# Patient Record
Sex: FEMALE | Race: WHITE | Employment: FULL TIME | URBAN - METROPOLITAN AREA
[De-identification: names, ages, dates, MRNs, and addresses within clinical notes are randomized per-mention and may not be internally consistent; named-entity substitution may affect disease eponyms.]

---

## 2019-03-21 ENCOUNTER — NURSE ONLY (OUTPATIENT)
Dept: FAMILY MEDICINE CLINIC | Age: 74
End: 2019-03-21

## 2019-04-09 ENCOUNTER — TELEPHONE (OUTPATIENT)
Dept: PRIMARY CARE CLINIC | Age: 74
End: 2019-04-09

## 2019-04-16 ENCOUNTER — NURSE TRIAGE (OUTPATIENT)
Dept: OTHER | Facility: CLINIC | Age: 74
End: 2019-04-16

## 2019-04-17 ENCOUNTER — NURSE TRIAGE (OUTPATIENT)
Dept: OTHER | Facility: CLINIC | Age: 74
End: 2019-04-17

## 2019-05-14 ENCOUNTER — TELEPHONE (OUTPATIENT)
Dept: PODIATRY | Age: 74
End: 2019-05-14

## 2019-05-29 ENCOUNTER — PROCEDURE VISIT (OUTPATIENT)
Dept: INTERVENTIONAL RADIOLOGY/VASCULAR | Age: 74
End: 2019-05-29

## 2019-05-29 DIAGNOSIS — M79.605 LEG PAIN, BILATERAL: Primary | ICD-10-CM

## 2019-05-29 DIAGNOSIS — M79.604 LEG PAIN, BILATERAL: Primary | ICD-10-CM

## 2019-06-03 ENCOUNTER — CARE COORDINATION (OUTPATIENT)
Dept: CASE MANAGEMENT | Age: 74
End: 2019-06-03

## 2019-06-10 ENCOUNTER — TELEPHONE (OUTPATIENT)
Dept: OTHER | Age: 74
End: 2019-06-10

## 2019-06-25 ENCOUNTER — CARE COORDINATION (OUTPATIENT)
Dept: CASE MANAGEMENT | Age: 74
End: 2019-06-25

## 2019-06-25 RX ORDER — FUROSEMIDE 10 MG/ML
SOLUTION ORAL DAILY
COMMUNITY
End: 2022-10-26 | Stop reason: ALTCHOICE

## 2019-07-15 ENCOUNTER — CARE COORDINATION (OUTPATIENT)
Dept: CASE MANAGEMENT | Age: 74
End: 2019-07-15

## 2019-07-24 ENCOUNTER — CARE COORDINATION (OUTPATIENT)
Dept: CASE MANAGEMENT | Age: 74
End: 2019-07-24

## 2019-08-02 ENCOUNTER — CARE COORDINATION (OUTPATIENT)
Dept: OTHER | Facility: CLINIC | Age: 74
End: 2019-08-02

## 2019-08-02 ENCOUNTER — CARE COORDINATION (OUTPATIENT)
Dept: CASE MANAGEMENT | Age: 74
End: 2019-08-02

## 2019-08-10 ENCOUNTER — CARE COORDINATION (OUTPATIENT)
Dept: CASE MANAGEMENT | Age: 74
End: 2019-08-10

## 2019-08-20 NOTE — PROGRESS NOTES
[id number]'Patient: Adult Zztest   Patient : 1955   MRN: <[id number]>     Reason for Admission: ***  Discharge Date: 19       RARS: No data recorded     Last Discharge [address] fjsdflsd  fsdf SDJ'f SDfl;SKJDf   SfjcL:SDJf\"ZS:      [id number]

## 2019-08-22 ENCOUNTER — CASE MANAGEMENT (OUTPATIENT)
Dept: OTHER | Facility: CLINIC | Age: 74
End: 2019-08-22

## 2019-09-11 ENCOUNTER — CARE COORDINATION (OUTPATIENT)
Dept: OTHER | Facility: CLINIC | Age: 74
End: 2019-09-11

## 2019-09-17 ENCOUNTER — CARE COORDINATION (OUTPATIENT)
Dept: OTHER | Facility: CLINIC | Age: 74
End: 2019-09-17

## 2019-09-17 SDOH — ECONOMIC STABILITY: TRANSPORTATION INSECURITY
IN THE PAST 12 MONTHS, HAS THE LACK OF TRANSPORTATION KEPT YOU FROM MEDICAL APPOINTMENTS OR FROM GETTING MEDICATIONS?: YES

## 2019-09-17 SDOH — ECONOMIC STABILITY: TRANSPORTATION INSECURITY
IN THE PAST 12 MONTHS, HAS LACK OF TRANSPORTATION KEPT YOU FROM MEETINGS, WORK, OR FROM GETTING THINGS NEEDED FOR DAILY LIVING?: YES

## 2019-09-18 ENCOUNTER — CARE COORDINATION (OUTPATIENT)
Dept: OTHER | Facility: CLINIC | Age: 74
End: 2019-09-18

## 2019-10-02 ENCOUNTER — CARE COORDINATION (OUTPATIENT)
Dept: CASE MANAGEMENT | Age: 74
End: 2019-10-02

## 2019-10-02 DIAGNOSIS — I10 ESSENTIAL HYPERTENSION: Primary | ICD-10-CM

## 2019-10-09 RX ORDER — ACETAMINOPHEN 325 MG/1
325 TABLET ORAL EVERY 4 HOURS PRN
COMMUNITY
End: 2022-09-13

## 2019-10-09 NOTE — CARE COORDINATION
flags related to discharge diagnosis. Reviewed and educated them on any new and changed medications related to discharge diagnosis. Advised obtaining a 90-day supply of all daily and as-needed medications. Education provided regarding infection prevention, and signs and symptoms of COVID-19 and when to seek medical attention with patient who verbalized understanding. Discussed exposure protocols and quarantine from [address] you at higher risk for severe illness 2019 and given an opportunity for questions and concerns. The patient agrees to contact the COVID-19 hotline [phone] or PCP office for questions related to their healthcare. CTN/ACM provided contact information for future reference. From CDC: Are you at higher risk for severe illness?            Wash your hands often.            Avoid close contact (6 feet, which is about two arm lengths) with people who are sick.            Put distance between yourself and other people if COVID-19 is spreading in your community.            Clean and disinfect frequently touched surfaces.            Avoid all cruise travel and non-essential air travel.            Call your healthcare professional if you have concerns about COVID-19 and your underlying condition or if you are sick. For more information on steps you can take to protect yourself, see CDC's How to Protect Yourself     Patient/family/caregiver given information for Jeffy Cantor and agrees to enroll yes  Patient's preferred e-mail: [email or web address] [email or web address]   Patient's preferred phone number: [phone]  Based on Loop alert triggers, patient will be contacted by nurse care manager for worsening symptoms.               Facility: [unfilled]    Non-face-to-face services provided:  Scheduled appointment with PCP-5/20    Care Transitions 24 Hour Call    Schedule Follow Up Appointment with PCP:  Declined  Patient-reported symptoms:  Pain (Comment: 10/9 incision to abdomen pain improving

## 2019-10-29 ENCOUNTER — CARE COORDINATION (OUTPATIENT)
Dept: CASE MANAGEMENT | Age: 74
End: 2019-10-29

## 2019-11-08 ENCOUNTER — TELEPHONE (OUTPATIENT)
Dept: PULMONOLOGY | Age: 74
End: 2019-11-08

## 2019-11-14 ENCOUNTER — CARE COORDINATION (OUTPATIENT)
Dept: CARE COORDINATION | Age: 74
End: 2019-11-14

## 2019-11-14 ENCOUNTER — NURSE ONLY (OUTPATIENT)
Dept: CARE COORDINATION | Age: 74
End: 2019-11-14

## 2019-11-15 ENCOUNTER — CARE COORDINATION (OUTPATIENT)
Dept: CARE COORDINATION | Age: 74
End: 2019-11-15

## 2019-11-26 DIAGNOSIS — I10 ESSENTIAL HYPERTENSION: Primary | ICD-10-CM

## 2019-12-02 ENCOUNTER — CARE COORDINATION (OUTPATIENT)
Dept: CARE COORDINATION | Age: 74
End: 2019-12-02

## 2019-12-02 SDOH — SOCIAL STABILITY: SOCIAL NETWORK
DO YOU BELONG TO ANY CLUBS OR ORGANIZATIONS SUCH AS CHURCH GROUPS UNIONS, FRATERNAL OR ATHLETIC GROUPS, OR SCHOOL GROUPS?: NO

## 2019-12-02 SDOH — HEALTH STABILITY: PHYSICAL HEALTH: ON AVERAGE, HOW MANY MINUTES DO YOU ENGAGE IN EXERCISE AT THIS LEVEL?: PATIENT DECLINED

## 2019-12-02 SDOH — ECONOMIC STABILITY: TRANSPORTATION INSECURITY
IN THE PAST 12 MONTHS, HAS THE LACK OF TRANSPORTATION KEPT YOU FROM MEDICAL APPOINTMENTS OR FROM GETTING MEDICATIONS?: NO

## 2019-12-02 SDOH — ECONOMIC STABILITY: FOOD INSECURITY: WITHIN THE PAST 12 MONTHS, THE FOOD YOU BOUGHT JUST DIDN'T LAST AND YOU DIDN'T HAVE MONEY TO GET MORE.: SOMETIMES TRUE

## 2019-12-02 SDOH — ECONOMIC STABILITY: FOOD INSECURITY: WITHIN THE PAST 12 MONTHS, YOU WORRIED THAT YOUR FOOD WOULD RUN OUT BEFORE YOU GOT MONEY TO BUY MORE.: SOMETIMES TRUE

## 2019-12-02 SDOH — SOCIAL STABILITY: SOCIAL NETWORK: HOW OFTEN DO YOU ATTEND CHURCH OR RELIGIOUS SERVICES?: MORE THAN 4 TIMES PER YEAR

## 2019-12-02 SDOH — HEALTH STABILITY: MENTAL HEALTH: HOW MANY STANDARD DRINKS CONTAINING ALCOHOL DO YOU HAVE ON A TYPICAL DAY?: 1 OR 2

## 2019-12-02 SDOH — SOCIAL STABILITY: SOCIAL NETWORK: HOW OFTEN DO YOU ATTENT MEETINGS OF THE CLUB OR ORGANIZATION YOU BELONG TO?: NEVER

## 2019-12-02 SDOH — HEALTH STABILITY: MENTAL HEALTH: HOW OFTEN DO YOU HAVE A DRINK CONTAINING ALCOHOL?: MONTHLY OR LESS

## 2019-12-02 SDOH — ECONOMIC STABILITY: INCOME INSECURITY: HOW HARD IS IT FOR YOU TO PAY FOR THE VERY BASICS LIKE FOOD, HOUSING, MEDICAL CARE, AND HEATING?: SOMEWHAT HARD

## 2019-12-02 SDOH — ECONOMIC STABILITY: TRANSPORTATION INSECURITY
IN THE PAST 12 MONTHS, HAS LACK OF TRANSPORTATION KEPT YOU FROM MEETINGS, WORK, OR FROM GETTING THINGS NEEDED FOR DAILY LIVING?: NO

## 2019-12-02 SDOH — SOCIAL STABILITY: SOCIAL NETWORK
IN A TYPICAL WEEK, HOW MANY TIMES DO YOU TALK ON THE PHONE WITH FAMILY, FRIENDS, OR NEIGHBORS?: MORE THAN THREE TIMES A WEEK

## 2019-12-02 SDOH — HEALTH STABILITY: PHYSICAL HEALTH
ON AVERAGE, HOW MANY DAYS PER WEEK DO YOU ENGAGE IN MODERATE TO STRENUOUS EXERCISE (LIKE A BRISK WALK)?: PATIENT DECLINED

## 2019-12-02 SDOH — SOCIAL STABILITY: SOCIAL NETWORK: HOW OFTEN DO YOU GET TOGETHER WITH FRIENDS OR RELATIVES?: TWICE A WEEK

## 2019-12-02 SDOH — SOCIAL STABILITY: SOCIAL NETWORK: ARE YOU MARRIED, WIDOWED, DIVORCED, SEPARATED, NEVER MARRIED, OR LIVING WITH A PARTNER?: MARRIED

## 2019-12-02 ASSESSMENT — PATIENT HEALTH QUESTIONNAIRE - PHQ9: SUM OF ALL RESPONSES TO PHQ QUESTIONS 1-9: 12

## 2019-12-06 DIAGNOSIS — R07.9 CHEST PAIN, UNSPECIFIED TYPE: Primary | ICD-10-CM

## 2019-12-13 DIAGNOSIS — I10 ESSENTIAL HYPERTENSION: Primary | ICD-10-CM

## 2019-12-26 DIAGNOSIS — M17.12 ARTHRITIS OF LEFT KNEE: Primary | ICD-10-CM

## 2019-12-26 RX ORDER — MELOXICAM 7.5 MG/1
7.5 TABLET ORAL DAILY
Qty: 30 TABLET | Refills: 5 | Status: SHIPPED | OUTPATIENT
Start: 2019-12-26 | End: 2022-07-13

## 2020-01-16 ENCOUNTER — CLINICAL DOCUMENTATION (OUTPATIENT)
Dept: ONCOLOGY | Age: 75
End: 2020-01-16

## 2020-01-18 ENCOUNTER — TELEPHONE (OUTPATIENT)
Dept: OTHER | Age: 75
End: 2020-01-18

## 2020-02-03 NOTE — CARE COORDINATION
PRIOR TO SENDING THIS REFERRAL IN THE MANNER NOTED BELOW, PLEASE ADD ALL AMBULATORY CARE COORDINATION SOCIAL WORKERS ([facility] SW'S) TO THE PATIENT'S CARE TEAM.    PLEASE ANSWER \"YES\" TO AT LEAST ONE OF THE QUESTIONS BELOW, OR, IF DESIRED,  PROVIDE A NARRATIVE DESCRIPTION FOR WHY YOU ARE MAKING THIS REFERRAL    THIS SMARTPHRASE SHOULD BE IMBEDDED IN THE MOST RECENT ENCOUNTER IN WHICH YOU DISCUSSED SW REFERRAL WITH THE PATIENT. YOU DO NOT NEED TO ROUTE THIS ENCOUNTER OR A STAFF MESSAGE REGARDING THIS REFERRAL TO THE [facility] SW'S (ALTHOUGH YOU MAY IF YOU WISH). YOU MUST ADD ALL OF THE [facility] SW'S TO THE CARE TEAM PRIOR TO COMPLETING THIS SMARTPHRASE, HOWEVER.    -This patient is referred this date to [facility] SW'S for review, assessment, and consultation as needed regarding the following presenting concern(s). Please bold all that apply. {Yes No N/A:[id number]}    [facility] assessment of patient's mental health diagnosis (if any) is indicated and requested    {Yes No N/A:[id number]}    [facility] assessment of patient's substance use disorder, if any, is indicated and requested. {Yes No [id number]   Patient has concerns about apparently deteriorating mental status. {Yes No D/O:[id number]}   Patient desires assistance with locating an accessible behavioral health treatment provider. {Yes No I/X:7827176916}   Patient has questions/concerns regarding application and/or eligibility for Medicaid. {Yes No S/C:3619552132}   Patient has questions/concerns regarding application and/or eligibility for a Medicaid Waiver program ([place], Home Care Waiver, Assisted Living Waiver, etc.). {Yes No L/H:9945486886}   Patient has questions/concerns about the cost of prescription drugs. {Yes No G/V:9517160254}   Patient has questions/concerns regarding Medicare coverage, Including Part D prescription drug coverage.      {Yes No I/L:3457836956}   Patient desires supportive services in the home regarding activities of daily living (homemaking, transferring, bathing, toileting, transportation, shopping, etc.). {Yes No E/C:1439776336}   Patient desires information about long-term care in a skilled nursing facility (SNF). {Yes No Z/V:7030815013}   Patient has inadequate and/or unaffordable housing. {Yes No [id number]   Patient desires assistance with smoking cessation (Note: select this option only if patient is reasonably unable to participate in smoking cessation support groups offered periodically at [facility]). {Yes No B/Z:2035271046}   Patient desires information about advance directives (Power of  [place], Living Will, DNR, Guardianship, etc.).     Please provide additional information if needed: (additional needs, household size, monthly income, source of income) ***      PLEASE ENSURE THAT THIS SMARTPHRASE HAS BEEN INCLUDED IN YOUR MOST RECENT ENCOUNTER WITH THE PATIENT AND THAT YOU HAVE ADDED ALL ACC SW\"S TO THE CARE TEAM.

## 2020-02-07 ENCOUNTER — TELEPHONE (OUTPATIENT)
Dept: ADMINISTRATIVE | Age: 75
End: 2020-02-07

## 2020-02-25 ENCOUNTER — CARE COORDINATION (OUTPATIENT)
Dept: CARE COORDINATION | Age: 75
End: 2020-02-25

## 2020-03-12 ENCOUNTER — CARE COORDINATION (OUTPATIENT)
Dept: CARE COORDINATION | Age: 75
End: 2020-03-12

## 2020-03-12 ENCOUNTER — CARE COORDINATION (OUTPATIENT)
Dept: FAMILY MEDICINE CLINIC | Age: 75
End: 2020-03-12

## 2020-03-30 ENCOUNTER — CARE COORDINATION (OUTPATIENT)
Dept: CARE COORDINATION | Age: 75
End: 2020-03-30

## 2020-05-04 ENCOUNTER — CARE COORDINATION (OUTPATIENT)
Dept: OTHER | Facility: CLINIC | Age: 75
End: 2020-05-04

## 2020-05-04 NOTE — PATIENT INSTRUCTIONS
disease. This may be because the same things that cause preeclampsia also cause heart and kidney disease. To protect your health, work with your doctor on living a heart-healthy lifestyle and getting the checkups you need. Your doctor may also want you to check your blood pressure at home. Follow-up care is a key part of your treatment and safety. Be sure to make and go to all appointments, and call your doctor if you are having problems. It's also a good idea to know your test results and keep a list of the medicines you take. Where can you learn more? Go to [email or web address] and sign in to your [employer] account. Enter Y205 in the UsherBuddy box to learn more about \"Learning About Preeclampsia After Childbirth. \"     If you do not have an account, please click on the \"Sign Up Now\" link. Current as of: May 29, 2019Content Version: 12.4  © 6816-0513 Nanosys. Care instructions adapted under license by [facility] ([facility]). If you have questions about a medical condition or this instruction, always ask your healthcare professional. [place] any warranty or liability for your use of this information. Patient Education        Preeclampsia: Care Instructions  Overview    Preeclampsia occurs when a woman's blood pressure rises during pregnancy. Often with preeclampsia, you also have swelling in your legs, hands, and face. A test may show too much protein in your urine. Preeclampsia is also called toxemia. If preeclampsia is severe and not treated, it can lead to seizures (eclampsia) and damage to your liver or kidneys. Preeclampsia can prevent your baby from getting enough food and oxygen. This can cause a low birth weight or other problems. Your doctor will watch you closely to prevent these problems. He or she also may recommend that you reduce your activity.  If your preeclampsia is a danger to your health or the health of your baby,

## 2020-05-04 NOTE — CARE COORDINATION
Rocio 45 Transitions Initial Follow Up Call    Call within 2 business days of discharge: Yes    Patient: Adult Gia Patient : 1955   MRN: [id number]  Reason for Admission: [address]  Discharge Date: 20 RARS: Readmission Risk Score: 5      Last Discharge [facility]       Complaint Diagnosis Description Type Department Provider    20   Admission (Discharged) [facility] Ec/Ip Surgery Physician, MD           Spoke with: Adult Gia, patient    Facility: [address] [place] [address]        Non-face-to-face services provided:  Obtained and reviewed discharge summary and/or continuity of care documents  Education of patient/family/caregiver/guardian to support self-management-reviewed [facility]  Assessment and support for treatment adherence and medication management-referred to [address] for med questions, edu dxy  Assistance in accessing community resources-[facility], grief counceling resourceds  COVID 19 Risk Education    Care Transitions 24 Hour Call    Schedule Follow Up Appointment with PCP:  Completed  Do you have any ongoing symptoms?:  Yes  Patient-reported symptoms:  Abdominal Pain, Nausea (Comment: patient reports nausea and pain in LUQ)  Interventions for patient-reported symptoms:  Notified PCP/Physician (Comment: PCP notified via EPIC message)  Do you have a copy of your discharge instructions?:  Yes  Do you have all of your prescriptions and are they filled?:  Yes  Have you been contacted by a [facility] Pharmacist?:  Yes  Have you scheduled your follow up appointment?:  Yes  Were you discharged with any Home Care or Post Acute Services:  Yes  Patient DME:  Other, Quad cane, Walker  Patient Home Equipment:  Oxygen (Comment: Supplied by [employer])  Do you have support at home?:  Partner/Spouse/SO, Child  Do you feel like you have everything you need to keep you well at home?:  No  Are you an active caregiver in your home?:  Yes  Care Transitions Interventions    Pharmacist:  Completed

## 2020-05-06 ENCOUNTER — CARE COORDINATION (OUTPATIENT)
Dept: OTHER | Facility: CLINIC | Age: 75
End: 2020-05-06

## 2020-05-06 NOTE — CARE COORDINATION
Interventions    Pharmacist:  Completed      Physical Therapy:  Completed      Pulmonary Rehab:  Completed Transportation Support:  Completed   Diabetes Education:  Completed Medication Assistance Program:  Completed      Occupational Therapy:  Completed Social Work:  Completed         Goals Addressed                 This Visit's Progress     Nutrition Plan   Improving     I will follow a nutritional plan as directed   Low Sodium:  2g    Barriers: overwhelmed by complexity of regimen, stress, time constraints and lack of education  Plan for overcoming my barriers: pt will use a food log to monitor food intake   Confidence: 8/10  Anticipated Goal Completion Date: 5/20/20 5/12- Pt has recorded 2/3 meals each day and 1 snack. ACM suggested alarm for lunch time food log recording. Follow Up  No future appointments.     Edwin Hinojosa RN

## 2020-05-06 NOTE — PATIENT INSTRUCTIONS
Patient Education        High Blood Pressure in Pregnancy: Care Instructions  Your Care Instructions    High blood pressure (hypertension) means that the force of blood against your artery walls is too strong. Mild high blood pressure during pregnancy is not usually dangerous. Your doctor will probably just want to watch you closely. But when blood pressure is very high, it can reduce oxygen to your baby. This can affect how well your baby grows. High blood pressure also means that you are at higher risk for:  · Preeclampsia. This is a problem that includes high blood pressure and damage to your liver or kidneys. It can also reduce how much oxygen your baby gets. In some cases, it leads to eclampsia. Eclampsia causes seizures. · Placental abruption. This is a problem when the placenta separates from the uterus before birth. It prevents the baby from getting enough oxygen and nutrients. Sometimes it can cause death for the baby and the mother. To prevent problems for you or your baby, you will have to check your blood pressure often. You will do this until after your baby is born. If your blood pressure rises suddenly or is very high during your pregnancy, your doctor may prescribe medicines. They can usually control blood pressure. If your blood pressure affects your or your baby's health, your doctor may need to deliver your baby early. After your baby is born, your blood pressure will probably improve. But sometimes blood pressure problems continue after birth. Follow-up care is a key part of your treatment and safety. Be sure to make and go to all appointments, and call your doctor if you are having problems. It's also a good idea to know your test results and keep a list of the medicines you take. How can you care for yourself at home? · Take and write down your blood pressure at home if your doctor says to. · Take your medicines exactly as prescribed.  Call your doctor if you think you are having a problem with your medicine. · Do not smoke. This is one of the best things you can do to help your baby be healthy. If you need help quitting, talk to your doctor about stop-smoking programs and medicines. These can increase your chances of quitting for good. · Don't gain too much weight during pregnancy. Talk to your doctor about how much weight gain is healthy. · Eat a healthy diet. · If your doctor says it's okay, get regular exercise. Walking or swimming several times a week can be healthy for you and your baby. · Reduce stress, and find time to relax. This is very important if you continue to work or have a busy schedule. It's also important if you have small children at home. When should you call for help? Call 911 anytime you think you may need emergency care. For example, call if:    · You passed out (lost consciousness).     · You have a seizure.    Call your doctor now or seek immediate medical care if:    · You have symptoms of preeclampsia, such as:  ? Sudden swelling of your face, hands, or feet. ? New vision problems (such as dimness, blurring, or seeing spots). ? A severe headache.     · Your blood pressure is higher than it should be or rises suddenly.     · You have new nausea or vomiting.     · You think that you are in labor.     · You have pain in your belly or pelvis.    Watch closely for changes in your health, and be sure to contact your doctor if:    · You gain weight rapidly. Where can you learn more? Go to https://RedKite Financial Marketsterrie.Jack in the Box. org and sign in to your Digify account. Enter T662 in the 8eighty Wear box to learn more about \"High Blood Pressure in Pregnancy: Care Instructions. \"     If you do not have an account, please click on the \"Sign Up Now\" link. Current as of: May 29, 2019Content Version: 12.4  © 6758-6872 Healthwise, Incorporated. Care instructions adapted under license by [facility] ([facility]).  If you have questions about a medical condition or this

## 2020-06-02 ENCOUNTER — CARE COORDINATION (OUTPATIENT)
Dept: OTHER | Facility: CLINIC | Age: 75
End: 2020-06-02

## 2020-06-02 ENCOUNTER — VIRTUAL VISIT (OUTPATIENT)
Dept: FAMILY MEDICINE CLINIC | Age: 75
End: 2020-06-02

## 2020-06-02 VITALS — HEART RATE: 66 BPM | DIASTOLIC BLOOD PRESSURE: 76 MMHG | SYSTOLIC BLOOD PRESSURE: 134 MMHG | WEIGHT: 198 LBS

## 2020-06-02 ASSESSMENT — LIFESTYLE VARIABLES
HOW MANY STANDARD DRINKS CONTAINING ALCOHOL DO YOU HAVE ON A TYPICAL DAY: 0
HOW OFTEN DURING THE LAST YEAR HAVE YOU HAD A FEELING OF GUILT OR REMORSE AFTER DRINKING: 0
HAS A RELATIVE, FRIEND, DOCTOR, OR ANOTHER HEALTH PROFESSIONAL EXPRESSED CONCERN ABOUT YOUR DRINKING OR SUGGESTED YOU CUT DOWN: 0
HOW OFTEN DURING THE LAST YEAR HAVE YOU FOUND THAT YOU WERE NOT ABLE TO STOP DRINKING ONCE YOU HAD STARTED: 0
HOW OFTEN DURING THE LAST YEAR HAVE YOU NEEDED AN ALCOHOLIC DRINK FIRST THING IN THE MORNING TO GET YOURSELF GOING AFTER A NIGHT OF HEAVY DRINKING: 0
HAVE YOU OR SOMEONE ELSE BEEN INJURED AS A RESULT OF YOUR DRINKING: 0
HOW OFTEN DO YOU HAVE SIX OR MORE DRINKS ON ONE OCCASION: 1
HOW OFTEN DURING THE LAST YEAR HAVE YOU FAILED TO DO WHAT WAS NORMALLY EXPECTED FROM YOU BECAUSE OF DRINKING: 1
HOW OFTEN DO YOU HAVE A DRINK CONTAINING ALCOHOL: 1
AUDIT-C TOTAL SCORE: 2
HOW OFTEN DURING THE LAST YEAR HAVE YOU BEEN UNABLE TO REMEMBER WHAT HAPPENED THE NIGHT BEFORE BECAUSE YOU HAD BEEN DRINKING: 0
AUDIT TOTAL SCORE: 3

## 2020-06-02 ASSESSMENT — PATIENT HEALTH QUESTIONNAIRE - PHQ9: SUM OF ALL RESPONSES TO PHQ QUESTIONS 1-9: 11

## 2020-06-02 NOTE — PATIENT INSTRUCTIONS
you learn more? Go to [email or web address]://[email or web address].[email or web address] org and sign in to your WeVue account. Enter [id number] in the [facility] box to learn more about \"Learning About [address]. \"     If you do not have an account, please click on the \"Sign Up Now\" link. Current as of: December 9, 2019               Content Version: 12.5  © 9776-0010 Livestation. Care instructions adapted under license by [facility] ([facility]). If you have questions about a medical condition or this instruction, always ask your healthcare professional. [address] any warranty or liability for your use of this information. Learning About Dominic Snowden  What is a living will? A living will, also called a declaration, is a legal form. It tells your family and your doctor your wishes when you can't speak for yourself. It's used by the health professionals who will treat you as you near the end of your life or if you get seriously hurt or ill. If you put your wishes in writing, your loved ones and others will know what kind of care you want. They won't need to guess. This can ease your mind and be helpful to others. And you can change or cancel your living will at any time. A living will is not the same as an estate or property will. An estate will explains what you want to happen with your money and property after you die. How do you use it? A living will is used to describe the kinds of treatment or life support you want as you near the end of your life or if you get seriously hurt or ill. Keep these facts in mind about living vasquez. · Your living will is used only if you can't speak or make decisions for yourself. Most often, one or more doctors must certify that you can't speak or decide for yourself before your living will takes effect. · If you get better and can speak for yourself again, you can accept or refuse any treatment.  It doesn't matter what you said in your can't breathe on your own, your heart stops, or you can't swallow. · What things would you still want to be able to do after you receive life-support methods? Would you want to be able to walk? To speak? To eat on your own? To live without the help of machines? · Do you want certain Moravian practices performed if you become very ill? · If you have a choice, where do you want to be cared for? In your home? At a hospital or nursing home? · If you have a choice at the end of your life, where would you prefer to die? At home? In a hospital or nursing home? Somewhere else? · Would you prefer to be buried or cremated? · Do you want your organs to be donated after you die? What should you do with your living will? · Make sure that your family members and your health care agent have copies of your living will (also called a declaration). · Give your doctor a copy of your living will. Ask him or her to keep it as part of your medical record. If you have more than one doctor, make sure that each one has a copy. · Put a copy of your living will where it can be easily found. For example, some people may put a copy on their refrigerator door. If you are using a digital copy, be sure your doctor, family members, and health care agent know how to find and access it. Where can you learn more? Go to https://chpepiceweb.agreement24 [email or web address] org and sign in to your XSI [employer]ors account. Enter G186 in the [facility] box to learn more about \"Learning About Living Jeremias Doshi. \"     If you do not have an account, please click on the \"Sign Up Now\" link. Current as of: December 9, 2019               Content Version: 12.5  © 6745-1424 Healthwise, Incorporated. Care instructions adapted under license by [address] If you have questions about a medical condition or this instruction, always ask your healthcare professional. [address] any warranty or liability for your use of this information. Smoking  How can you decide about using medicines to quit smoking? What are the medicines you can use? Your doctor may prescribe varenicline (Chantix) or bupropion (Zyban). These medicines can help you cope with cravings for tobacco. They are pills that don't contain nicotine. You also can use nicotine replacement products. These do contain nicotine. There are many types. · Gum and lozenges slowly release nicotine into your mouth. · Patches stick to your skin. They slowly release nicotine into your bloodstream.  · An inhaler has a luke that contains nicotine. You breathe in a puff of nicotine vapor through your mouth and throat. · Nasal spray releases a mist that contains nicotine. What are key points about this decision? · Using medicines can double your chances of quitting smoking. They can ease cravings and withdrawal symptoms. · Getting counseling along with using medicine can raise your chances of quitting even more. · If you smoke fewer than 5 cigarettes a day, you may not need medicines to help you quit smoking. · These medicines have less nicotine than cigarettes. And by itself, nicotine is not nearly as harmful as smoking. The tars, carbon monoxide, and other toxic chemicals in tobacco cause the harmful effects. · The side effects of nicotine replacement products depend on the type of product. For example, a patch can make your skin red and itchy. Medicines in pill form can make you sick to your stomach. They can also cause dry mouth and trouble sleeping. For most people, the side effects are not bad enough to make them stop using the products. Why might you choose to use medicines to quit smoking? · You have tried on your own to stop smoking, but you were not able to stop. · You smoke more than 5 cigarettes a day. · You want to increase your chances of quitting smoking. · You want to reduce your cravings and withdrawal symptoms.   · You feel the benefits of medicine outweigh the side

## 2020-06-02 NOTE — PROGRESS NOTES
impairment    Depression:  PHQ-2 Score: 4  PHQ-9 Total Score: 11  Depression Screening Interpretation: (!) PHQ-9 Score 10-14:  Moderate Depression  Depression Interventions:  · Medication adjusted- zoloft increased dose     Substance Abuse:  Social History     Tobacco History     Smoking Status  Current Every Day Smoker Smoking Start Date  12/1/2010 Smoking Frequency  2 packs/day for 20 years (40 pk yrs) Smoking Tobacco Type  Cigarettes    Smokeless Tobacco Use  Never Used    Tobacco Comment  Provided [id number]          Alcohol History     Alcohol Use Status  Yes Drinks/Week  2 Shots of liquor per week Amount  2.0 standard drinks of alcohol/wk          Drug Use     Drug Use Status  Not Currently          Sexual Activity     Sexually Active  Yes Partners  Male Birth Control/Protection  Post-menopausal               Audit Questionnaire: Screen for Alcohol Misuse  How often do you have a drink containing alcohol?: Monthly or less  How many standard drinks containing alcohol do you have on a typical day when drinking?: One or two  How often do you have six or more drinks on one occasion?: Less than monthly  Audit-C Score: 2  During the past year, how often have you found that you were not able to stop drinking once you had started?: Never  During the past year, how often have you failed to do what was normally expected of you because of drinking?: Less than Monthly  During the past year, how often have you needed a drink in the morning to get yourself going after a heavy drinking session?: Never  During the past year, how often have you had a feeling of guilt or remorse after drinking?: Never  During the past year, have you been unable to remember what happened the night before because you had been drinking?: Never  Have you or someone else been injured as a result of your drinking?: No  Has a relative or friend, doctor or health worker been concerned about your drinking or suggested you cut down?: No  Total Score:

## 2020-06-04 ENCOUNTER — NURSE ONLY (OUTPATIENT)
Dept: INTERNAL MEDICINE CLINIC | Age: 75
End: 2020-06-04

## 2020-06-04 RX ORDER — AMOXICILLIN 500 MG/1
500 TABLET, FILM COATED ORAL SEE ADMIN INSTRUCTIONS
COMMUNITY
Start: 2020-01-10 | End: 2020-06-24 | Stop reason: ALTCHOICE

## 2020-06-16 ENCOUNTER — CARE COORDINATION (OUTPATIENT)
Dept: OTHER | Facility: CLINIC | Age: 75
End: 2020-06-16

## 2020-06-18 ENCOUNTER — CARE COORDINATION (OUTPATIENT)
Dept: OTHER | Facility: CLINIC | Age: 75
End: 2020-06-18

## 2020-06-18 NOTE — CARE COORDINATION
Rocio 45 Transitions Initial Follow Up Call    Call within 2 business days of discharge: Yes    Patient: Adult Gia Patient : 1955   MRN: [id number]  Reason for Admission: stroke  Discharge Date: 20 RARS: Readmission Risk Score: 5      Last Discharge [facility]       Complaint Diagnosis Description Type Department Provider    20   Admission (Discharged) [facility] Ec/Ip Surgery Physician, MD           Spoke with: patient    Facility: [unfilled]    Non-face-to-face services provided:  Obtained and reviewed discharge summary and/or continuity of care documents    Care Transitions 24 Hour Call    Do you have all of your prescriptions and are they filled?:  No (Comment: 20: ordered metformin refill from [facility]. )  Patient DME:  A[address], Other  Patient Home Equipment:  CPAP (Comment: Supplied by [facility] updated Rx and unit)  Do you have support at home?:  Partner/Spouse/SO, Child  Are you an active caregiver in your home?:  Yes  Care Transitions Interventions         Follow Up  No future appointments.     Dwayne Cabrera RN

## 2020-07-11 ENCOUNTER — HOSPITAL ENCOUNTER (OUTPATIENT)
Dept: GENERAL RADIOLOGY | Age: 75
Discharge: HOME | End: 2020-07-11
Payer: COMMERCIAL

## 2020-07-11 ENCOUNTER — HOSPITAL ENCOUNTER (OUTPATIENT)
Dept: CT IMAGING | Age: 75
Discharge: HOME | End: 2020-07-11
Payer: COMMERCIAL

## 2020-07-15 ENCOUNTER — CARE COORDINATION (OUTPATIENT)
Dept: OTHER | Facility: CLINIC | Age: 75
End: 2020-07-15

## 2020-10-07 ENCOUNTER — CARE COORDINATION (OUTPATIENT)
Dept: CASE MANAGEMENT | Age: 75
End: 2020-10-07

## 2020-10-07 NOTE — CARE COORDINATION
10/7/2020 Final  Patient no longer eligible for BPCI bundle due to excluded DRG. 6/24/2020 to 6/26/2020.     ADA Vyas / [facility]

## 2020-10-19 ENCOUNTER — NURSE ONLY (OUTPATIENT)
Dept: INTERNAL MEDICINE | Age: 75
End: 2020-10-19

## 2020-10-24 ENCOUNTER — CARE COORDINATION (OUTPATIENT)
Dept: CARE COORDINATION | Age: 75
End: 2020-10-24

## 2020-10-29 ENCOUNTER — CARE COORDINATION (OUTPATIENT)
Dept: CARE COORDINATION | Age: 75
End: 2020-10-29

## 2020-11-04 ENCOUNTER — CARE COORDINATION (OUTPATIENT)
Dept: CARE COORDINATION | Age: 75
End: 2020-11-04

## 2020-11-04 NOTE — CARE COORDINATION
Called and spoke to patient. Called and spoke to patient. Called and spoke with patient. Attempted to contact patient for follow up BPCI-A transition call. ADA Taylor / [facility] [place], [place]    [phone]  Gregoria Norwood / Roc[facility] Coordinator    Patient has all medications is taking medications as directed and has no questions concerning medications at this time. No C/O wheezing, cough, chest pain, fatigue, fever, appetite good.

## 2020-11-11 ENCOUNTER — CARE COORDINATION (OUTPATIENT)
Dept: CARE COORDINATION | Age: 75
End: 2020-11-11

## 2020-11-13 ENCOUNTER — CARE COORDINATION (OUTPATIENT)
Dept: OTHER | Facility: CLINIC | Age: 75
End: 2020-11-13

## 2020-11-17 ENCOUNTER — CARE COORDINATION (OUTPATIENT)
Dept: OTHER | Facility: CLINIC | Age: 75
End: 2020-11-17

## 2020-11-30 ENCOUNTER — CARE COORDINATION (OUTPATIENT)
Dept: OTHER | Facility: CLINIC | Age: 75
End: 2020-11-30

## 2020-12-09 ENCOUNTER — CARE COORDINATION (OUTPATIENT)
Dept: CARE COORDINATION | Age: 75
End: 2020-12-09

## 2020-12-09 NOTE — CARE COORDINATION
Date/Time:  2020 10:39 AM    Patient contacted regarding remote symptom monitoring program alert or comment received. Verified patients name and  as identifiers. Discussed COVID-19 related testing which was available at this time. Test results were positive. Patient informed of results, if available? Yes    LPN contacted the patient by telephone regarding yellow alert in Loop remote symptom monitoring program.    Patient reported the following symptoms: fever, fatigue and cough. Due to continued symptoms, patient was advised to self-monitor symptoms at home. Due to no new or worsening symptoms encounter was not routed to provider for escalation. Education provided regarding infection prevention, and signs and symptoms of COVID-19 and when to seek medical attention with patient who verbalized understanding. Discussed exposure protocols and quarantine from [address] case you at higher risk for severe illness  and given an opportunity for questions and concerns. The patient agrees to contact the [facility] exposure line [phone], [address] of [facility]: ([phone]) and PCP office for questions related to their healthcare. From CDC: Are you at higher risk for severe illness?  Wash your hands often.  Avoid close contact (6 feet, which is about two arm lengths) with people who are sick.  Put distance between yourself and other people if COVID-19 is spreading in your community.  Clean and disinfect frequently touched surfaces.  Avoid all cruise travel and non-essential air travel.  Call your healthcare professional if you have concerns about COVID-19 and your underlying condition or if you are sick. For more information on steps you can take to protect yourself, see CDC's How to Protect Yourself      Patient will continue to self report symptoms using Loop self monitoring. Patient has LPN's contact information. Called patient.  States he is feeling fine. Occasional fever of 100. Taking tylenol with relief.     Kadi Pop, RANDALLN    [phone]  Kerry Caballero / Rocio Mas Coordinator

## 2021-01-21 ENCOUNTER — CARE COORDINATION (OUTPATIENT)
Dept: CASE MANAGEMENT | Age: 76
End: 2021-01-21

## 2021-01-21 NOTE — CARE COORDINATION
Patient contacted regarding COVID-19 Exposed to mother. Discussed COVID-19 related testing which was available at this time. Test results were positive. Patient informed of results, if available? Yes    Care Transition Nurse/ Ambulatory Care Manager contacted the patient by telephone to perform post discharge assessment. Call within 2 business days of discharge: Yes. Verified name and  with patient as identifiers. Provided introduction to self, and explanation of the CTN/ACM role, and reason for call due to risk factors for infection and/or exposure to COVID-19. Symptoms reviewed with patient who verbalized the following symptoms: fever, cough, shortness of breath, loss of taste or smell and chest pain. Due to no new or worsening symptoms encounter was not routed to provider for escalation. Discussed follow-up appointments. If no appointment was previously scheduled, appointment scheduling offered: Yes  [address] follow up appointment(s):   Future Appointments   Date Time Provider Zuri Pearson   2021  4:00 AM DARBY Corral - NP [facility]     Non-[facility] follow up appointment(s): 2021    Non-face-to-face services provided:  Obtained and reviewed discharge summary and/or continuity of care documents     Advance Care Planning:   Does patient have an Advance Directive:  not on file. Patient has following risk factors of: COPD, asthma, pneumonia and diabetes. CTN/ACM reviewed discharge instructions, medical action plan and red flags such as increased shortness of breath, increasing fever and signs of decompensation with patient who verbalized understanding. Discussed exposure protocols and quarantine with CDC Guidelines What to do if you are sick with coronavirus disease 2019.  Patient was given an opportunity for questions and concerns. The patient agrees to contact the [facility] exposure line [phone], local health department [place] [facility]: ([phone]) and PCP office for questions related to their healthcare. CTN/ACM provided contact information for future needs. Reviewed and educated patient on any new and changed medications related to discharge diagnosis     Patient/family/caregiver given information for GetWell Loop and agrees to enroll no  Patient's preferred e-mail:    Patient's preferred phone number:   Based on Loop alert triggers, patient will be contacted by nurse care manager for worsening symptoms. Plan for follow-up call in 3-5 days based on severity of symptoms and risk factors. Patient is doing well.     Terri Manriquez LPN    [phone]  [address] [place] [address] / [place] [address] Coordinator

## 2021-02-11 ENCOUNTER — TELEPHONE (OUTPATIENT)
Dept: ORTHOPEDIC SURGERY | Age: 76
End: 2021-02-11

## 2021-02-14 ENCOUNTER — CARE COORDINATION (OUTPATIENT)
Dept: CASE MANAGEMENT | Age: 76
End: 2021-02-14

## 2021-02-23 ENCOUNTER — CARE COORDINATION (OUTPATIENT)
Dept: CASE MANAGEMENT | Age: 76
End: 2021-02-23

## 2021-03-19 ENCOUNTER — TELEPHONE (OUTPATIENT)
Dept: EMERGENCY DEPT | Age: 76
End: 2021-03-19

## 2021-03-29 DIAGNOSIS — M54.2 NECK PAIN: Primary | ICD-10-CM

## 2021-04-06 ENCOUNTER — CARE COORDINATION (OUTPATIENT)
Dept: CARE COORDINATION | Age: 76
End: 2021-04-06

## 2021-04-07 DIAGNOSIS — I20.0 UNSTABLE ANGINA PECTORIS (HCC): Primary | ICD-10-CM

## 2021-04-26 ENCOUNTER — CARE COORDINATION (OUTPATIENT)
Dept: CASE MANAGEMENT | Age: 76
End: 2021-04-26

## 2021-07-01 ENCOUNTER — CARE COORDINATION (OUTPATIENT)
Dept: CASE MANAGEMENT | Age: 76
End: 2021-07-01

## 2021-07-01 DIAGNOSIS — I50.9 CONGESTIVE HEART FAILURE, UNSPECIFIED HF CHRONICITY, UNSPECIFIED HEART FAILURE TYPE (HCC): Primary | ICD-10-CM

## 2021-07-01 NOTE — CARE COORDINATION
[facility] Transitions Initial Follow Up Call    Call within 2 business days of discharge: Yes    Patient: Adult Gia Patient : 1947   MRN: [id number]  Reason for Admission: CHF  Discharge Date: 20 RARS: No data recorded    Last Discharge [facility]       Complaint Diagnosis Description Type Department Provider    20   [facility]        Transitions of Care Initial Call    Patient states she is doing well. Patient denies weight gain, SOB or wheezing, coughing, extremity edema, abdominal edema, or chest pain. Eating well. Patient has no needs or concerns for writer at this time. Reviewed medications with patient. Stopped lasix  Patient confirms they have all medications and medications are being taken as directed. There are no questions concerning medications at this time. 1111F sent to PCP. Will continue to follow. Was this an external facility discharge? No Discharge Facility: [address] [place] [address] to be reviewed by the provider   Additional needs identified to be addressed with provider No  none             Method of communication with provider : none      Advance Care Planning:   Does patient have an Advance Directive:  reviewed and current. Was this a readmission? No  Patient stated reason for admission: CHF  Patients top risk factors for readmission: lack of knowledge about disease and medication management    Care Transition Nurse (CTN) contacted the patient by telephone to perform post hospital discharge assessment. Verified name and  with patient as identifiers. Provided introduction to self, and explanation of the CTN role. CTN reviewed discharge instructions, medical action plan and red flags with patient who verbalized understanding. Patient given an opportunity to ask questions and does not have any further questions or concerns at this time. Were discharge instructions available to patient? Yes.     Reviewed appropriate site of care based on symptoms and resources available to patient including: PCP. The patient agrees to contact the PCP office for questions related to their healthcare. Medication reconciliation was performed with patient, who verbalizes understanding of administration of home medications. Advised obtaining a 90-day supply of all daily and as-needed medications. Covid Risk Education     Educated patient about risk for severe COVID-19 due to risk factors according to CDC guidelines. LPN CC reviewed discharge instructions, medical action plan and red flag symptoms with patient who verbalized understanding. Discussed COVID vaccination status No.   Education provided on COVID-19 vaccination as appropriate. Discussed exposure protocols and quarantine with CDC Guidelines. Patient was given an opportunity to verbalize any questions and concerns and agrees to contact LPN CC or health care provider for questions related to their healthcare. Reviewed and educated patient on any new and changed medications related to discharge diagnosis     Was patient discharged with a pulse oximeter? No     Discussed and confirmed pulse oximeter discharge instructions and when to notify provider or seek emergency care. LPN CC provided contact information. Plan for follow-up call in 5-7 days based on severity of symptoms and risk factors.        Non-face-to-face services provided:  Obtained and reviewed discharge summary and/or continuity of care documents    Care Transitions 24 Hour Call    Schedule Follow Up Appointment with PCP: Completed  Do you have any ongoing symptoms?: Yes  Patient-reported symptoms: Shortness of Breath  Do you have a copy of your discharge instructions?: Yes  Do you have all of your prescriptions and are they filled?: Yes (Comment: 6/16/20: ordered metformin refill from [facility] today. )  Have you been contacted by a Tim Michaels?: No  Have you scheduled your follow up appointment?: Yes (Comment: PCP 7/6/2021)  Were you discharged with any Home Care or Post Acute Services: Yes  Post Acute Services: Home Health (Comment: [facility])  Patient DME: Quad cane, Walker, Other  Patient Home Equipment: CPAP, Other (Comment: Supplied by [facility]- needs updated Rx and unit)  Do you have support at home?: Partner/Spouse/SO, Child  Do you feel like you have everything you need to keep you well at home?: Yes  Are you an active caregiver in your home?: Yes  Care Transitions Interventions    Pharmacist: Completed      Physical Therapy: Completed      Pulmonary Rehab: Completed Transportation Support: Completed     Registered Dietician: Completed DME Assistance: Completed   Diabetes Education: Completed Medication Assistance Program: Completed Smoking Cessation: Completed     Occupational Therapy: Completed Social Work: Completed    Disease Association: Completed             Follow Up  No future appointments.     Flip Mullins LPN

## 2021-07-16 ENCOUNTER — TELEPHONE (OUTPATIENT)
Dept: GASTROENTEROLOGY | Age: 76
End: 2021-07-16

## 2021-07-16 DIAGNOSIS — Z12.11 SCREENING FOR COLON CANCER: Primary | ICD-10-CM

## 2021-07-19 DIAGNOSIS — B96.89 ACUTE BACTERIAL SINUSITIS: Primary | ICD-10-CM

## 2021-07-19 DIAGNOSIS — Z12.11 SCREEN FOR COLON CANCER: Primary | ICD-10-CM

## 2021-07-19 DIAGNOSIS — J01.90 ACUTE BACTERIAL SINUSITIS: Primary | ICD-10-CM

## 2021-07-27 ENCOUNTER — NURSE ONLY (OUTPATIENT)
Dept: BEHAVIORAL/MENTAL HEALTH CLINIC | Age: 76
End: 2021-07-27

## 2021-07-27 DIAGNOSIS — F90.0 ATTENTION DEFICIT HYPERACTIVITY DISORDER (ADHD), PREDOMINANTLY INATTENTIVE TYPE: Primary | ICD-10-CM

## 2021-07-27 ASSESSMENT — PATIENT HEALTH QUESTIONNAIRE - PHQ9
SUM OF ALL RESPONSES TO PHQ QUESTIONS 1-9: 0
1. LITTLE INTEREST OR PLEASURE IN DOING THINGS: 0
SUM OF ALL RESPONSES TO PHQ9 QUESTIONS 1 & 2: 0
SUM OF ALL RESPONSES TO PHQ QUESTIONS 1-9: 0
SUM OF ALL RESPONSES TO PHQ QUESTIONS 1-9: 0
2. FEELING DOWN, DEPRESSED OR HOPELESS: 0

## 2021-07-27 NOTE — PATIENT INSTRUCTIONS
Patient Education        Learning About Attention Deficit Hyperactivity Disorder (ADHD) in Adults  What is ADHD? Attention deficit hyperactivity disorder (ADHD) is a condition in which people have a hard time paying attention. Adults with ADHD also may be more active than normal. They tend to act without thinking. ADHD may make it harder for them to focus, get organized, and finish tasks. ADHD most often starts in childhood and lasts into adulthood. Many adults don't know that they have ADHD until their children are diagnosed. Then they begin to see their own symptoms. Doctors don't know what causes ADHD. But it tends to run in families. What are the symptoms? The most common types of ADHD symptoms in adults are attention problems and hyperactivity. Attention problems  Adults with ADHD often find it hard to:  · Finish tasks that don't interest them or aren't easy. But they may become obsessed with activities that they find interesting and enjoy. · Keep relationships. · Focus their attention on conversations, reading materials, or jobs. They may change jobs a lot. · Remember things. They may misplace or lose things. · Pay attention. They are easily distracted. They find it hard to focus on one task. · Think before they act. They may make quick decisions. They may act before they think about the effect of their actions. Hyperactivity  Adults with ADHD may:  · Fidget. They may swing their legs, shift in their seats, or tap their fingers. · Move around a lot. They may feel \"revved up\" or on the go. They may not be able to slow down until they are very tired. · Find it hard to relax. They may feel restless and find it hard to do quiet things like read or watch TV. How does ADHD affect daily life? ADHD in adults may affect:  · Job performance. They may find it hard to organize their work, manage their time, and focus on one task at a time. They may forget, misplace, or lose things.  They may quit their jobs out of boredom. · Relationships. Adults with ADHD may find it hard to focus their attention on conversations. It is hard for them to \"read\" the behavior and moods of others and express their own feelings. · Temper. They may get easily frustrated. This often can make it harder for them to deal with stress. These adults may overreact and have a short, quick temper. · The ability to solve problems. Adults who have a hard time waiting for things they want may act before they think about the effect of their actions. They may take part in risky behaviors. These include unprotected sex, unsafe driving, alcohol and drug use, or unwise business ventures. How is ADHD treated? ADHD can be treated with medicines, behavior training, or counseling. Or it may be a combination of these treatments. Medicines  Stimulant medicines are most often used to treat ADHD. These may include:    · Amphetamines. (Examples are Adderall and Dexedrine).     · Methylphenidate. (Examples are Concerta, Daytrana, Focalin, Metadate, and Ritalin). Other medicines that may be used are:    · Atomoxetine. This includes Strattera, a nonstimulant medicine for ADHD.     · Antihypertensives. These include clonidine (such as Catapres) and guanfacine (such as Tenex).   · Antidepressants. These include bupropion (Wellbutrin). Behavior training  Behavior training can help adults with ADHD learn how to:    · Get organized. A daily organizer or planner can help these adults organize their daily tasks. They can write down appointments and other things they need to remember.     · Decrease distractions. They can set up their work or home environment so that there are fewer things that will distract them. They may find using headphones or a \"white noise\" machine helpful. College students can arrange a quiet living situation. They may need a single dorm room.     · Work on relationships.  Social skills training can help adults with ADHD relate to family, friends, and coworkers. Couples counseling or family therapy can also help improve relationships. Counseling  Counseling is not meant to treat inattention, hyperactivity, or impulsiveness. But it can help with some of the problems that go along with ADHD. These include not getting along well with others and having problems following rules. Where can you learn more? Go to https://chpejefferyewjan.healthSilverLine [employer]. org and sign in to your [employer] account. Enter B781 in the [employer] Print System box to learn more about \"Learning About Attention Deficit Hyperactivity Disorder (ADHD) in Adults. \"     If you do not have an account, please click on the \"Sign Up Now\" link. Current as of: September 23, 2020               Content Version: 12.9  © 2006-2021 Healthwise, Incorporated. Care instructions adapted under license by [facility] ([facility]). If you have questions about a medical condition or this instruction, always ask your healthcare professional. [address] any warranty or liability for your use of this information.

## 2021-07-27 NOTE — PROGRESS NOTES
Behavioral Health Consultation  Lisa Fung PsyD, [facility]  Clinical Psychologist  7/27/21  11:50 AM EDT      Time spent with Patient: 30 minutes  This is patient's third  [facility] appointment. Reason for Consult:  Difficulty with concentration due to ADHD  Referring Provider: DARBY Davis CNP    Pt provided informed consent for the behavioral health program. Discussed with patient model of service to include the limits of confidentiality (i.e. abuse reporting, suicide intervention, etc.) and short-term intervention focused approach. Pt indicated understanding. Feedback given to PCP.     S:  xxxx    O:  MSE:    Appearance    alert  Appetite normal  Sleep disturbance No  Fatigue No  Loss of pleasure No  Impulsive behavior No  Speech    spontaneous, rapid and interrupting  Mood    \"Good\"  Affect    Euthymic; full range  Thought Content    intact  Thought Process    goal directed  Associations    logical connections  Insight    Good  Judgment    Intact  Orientation    oriented to person, place, time, and general circumstances  Memory    recent and remote memory intact  Attention/Concentration    impaired  Morbid ideation No  Suicide Assessment    no suicidal ideation      History:    Medications:   Current Outpatient Medications   Medication Sig Dispense Refill    meloxicam (MOBIC) 7.5 MG tablet Take 1 tablet by mouth daily 30 tablet 5    acetaminophen (TYLENOL) 325 MG tablet Take 325 mg by mouth every 4 hours as needed for Pain      LOW-DOSE ASPIRIN PO Take by mouth      Cholecalciferol (VITAMIN D) 2000 units CAPS capsule Take by mouth      amLODIPine (NORVASC) 5 MG tablet Take 1 tablet by mouth       lisinopril-hydrochlorothiazide (ZESTORETIC) 20-12.5 MG per tablet Take 1 tablet by mouth      sertraline (ZOLOFT) 25 MG tablet Take 1 tablet by mouth      Omeprazole Magnesium (PRILOSEC) 2.5 MG PACK Take 5 mg by mouth      Melatonin 5 MG CAPS Take 1 capsule by mouth      medical marijuana Take by mouth as needed.  furosemide (LASIX) 10 MG/ML solution Take by mouth daily (Patient not taking: Reported on 7/1/2021)       No current facility-administered medications for this visit. Social History:   Social History     Socioeconomic History    Marital status:      Spouse name: Leila Gupta Number of children: 3    Years of education: 15    Highest education level: High school graduate   Occupational History    Occupation:      Employer: [employer]   Tobacco Use    Smoking status: Current Every Day Smoker     Packs/day: 2.00     Years: 20.00     Pack years: 40.00     Types: Cigarettes     Start date: 12/1/2010    Smokeless tobacco: Never Used    Tobacco comment: Provided [id number]   Vaping Use    Vaping Use: Never used   Substance and Sexual Activity    Alcohol use: Not Currently     Alcohol/week: 2.0 standard drinks     Types: 2 Shots of liquor per week    Drug use: Not Currently    Sexual activity: Yes     Partners: Male     Birth control/protection: Post-menopausal   Other Topics Concern    Not on file   Social History Narrative    Food insecurity identified. Food pantry list supplied to patient    Soup kitchen list supplied    Referral to [facility]     Social Determinants of Health     Financial Resource Strain:     Difficulty of Paying Living Expenses:    Food Insecurity:     Worried About Running Out of Food in the Last Year:     [address] in the Last Year:    Transportation Needs:     Lack of Transportation (Medical):      Lack of Transportation (Non-Medical):    Physical Activity:     Days of Exercise per Week:     Minutes of Exercise per Session:    Stress:     Feeling of Stress :    Social Connections:     Frequency of Communication with Friends and Family:     Frequency of Social Gatherings with Friends and Family:     Attends Christianity Services:     Active Member of Clubs or Organizations:     Attends Club or Organization Meetings:     Marital Status:    Intimate Partner Violence:     Fear of Current or Ex-Partner:     Emotionally Abused:     Physically Abused:     Sexually Abused:        TOBACCO:   reports that she has been smoking cigarettes. She started smoking about 10 years ago. She has a 40.00 pack-year smoking history. She has never used smokeless tobacco.  ETOH:   reports previous alcohol use of about 2.0 standard drinks of alcohol per week. Family History:   Family History   Problem Relation Age of Onset    High Blood Pressure Mother     High Cholesterol Mother     Arthritis Mother     Diabetes Father     Heart Disease Father          A:  Administered the PHQ9 which indicates a self report of minimal or no depressive symptom distress. Pt would benefit from [facility] services to increase coping skills to provide symptom management/control/relief. PHQ Scores 7/27/2021 6/2/2020 5/28/2020 5/28/2020 5/21/2020 12/2/2019   PHQ2 Score 0 4 0 0 2 2   PHQ9 Score 0 11 0 0 9 12     Interpretation of Total Score Depression Severity: 1-4 = Minimal depression, 5-9 = Mild depression, 10-14 = Moderate depression, 15-19 = Moderately severe depression, 20-27 = Severe depression      Diagnosis:    1. Attention deficit hyperactivity disorder (ADHD), predominantly inattentive type           Diagnosis Date    Depression     GERD (gastroesophageal reflux disease)     Hypertension     Osteoarthritis            Plan:  Pt interventions:  Provided handout on  ADHD    Return in about 4 weeks (around 8/24/2021). Pt Behavioral Change Plan:        Please note this report has been partially produced using speech recognition software  And may cause contain errors related to that system including grammar, punctuation and spelling as well as words and phrases that may seem inappropriate. If there are questions or concerns please feel free to contact me to clarify.

## 2021-10-21 ENCOUNTER — NURSE ONLY (OUTPATIENT)
Dept: OTHER | Facility: CLINIC | Age: 76
End: 2021-10-21

## 2021-10-21 VITALS — SYSTOLIC BLOOD PRESSURE: 123 MMHG | DIASTOLIC BLOOD PRESSURE: 86 MMHG

## 2021-10-21 NOTE — PROGRESS NOTES
Adult Gia is being seen today for a Blood Pressure Recheck.      Adult Gia was seen Visit date not found and her Blood Pressure was:   BP Readings from Last 1 Encounters:   10/21/21 123/86           Merlyn Dwyer

## 2021-10-26 ENCOUNTER — TELEPHONE (OUTPATIENT)
Dept: ADMINISTRATIVE | Age: 76
End: 2021-10-26

## 2021-10-26 NOTE — TELEPHONE ENCOUNTER
Patient Appointment Form:      PCP: Dr Ellis Aquino  Referring: same    Has the Patient:    Seen a Cardiologist? yes    date:10 years  Physician:Dr York  [facility]    Had a heart catheterization? yes   date: 8 years  Hospital:  [facility]    Had heart surgery? no    Had a stress test or nuclear stress test? yes   date: 2016 maybe, pt unsure of date   [facility]    Had an echocardiogram? yes   date: 2016 unsure of date   [facility]    Had a vascular ultrasound? no    Had a 24/48 heart monitor or extended cardiac event monitor? extended cardiac event/MCOT monitor    date: 2016 unsure of date      Who ordered: Dr Julian Chang    Had recent blood work in the last 6 months? no    Had a pacemaker/ICD/ILR implant? yes: ICD     device company: [employer]    Seen an Electrophysiologist? no        Will send records via: in [address] [place] [address] Rd      Date & time of appointment:  12+-03-21    0900   Dr Jaki Richard stated they were diagnosed with AAA in 2018, sees

## 2021-11-12 ENCOUNTER — CARE COORDINATION (OUTPATIENT)
Dept: OTHER | Facility: CLINIC | Age: 76
End: 2021-11-12

## 2021-12-01 NOTE — CARE COORDINATION
[facility] Transitions Follow Up Call    2022    Patient: Adult Gia  Patient : 1947   MRN: [id number]  Reason for Admission: Pneumonia  Discharge Date: 20 RARS: No data recorded       Spoke with: patient    Care Transitions Subsequent and Final Call    Subsequent and Final Calls  Patient-reported symptoms: Abdominal Pain  Interventions for patient-reported symptoms: Notified Home Care  Have your medications changed?: Yes  Patient Reports: billy in lasix doseage, discontinued norvasc  Do you have any questions related to your medications?: Yes  Patient Reports: how much lasix to take daily  Do you currently have any active services?: Yes  Are you currently active with any services?: Home Health  Do you have any needs or concerns that I can assist you with?: Yes  Patient-reported Needs or Concerns: scheduling follow up with cardiologist  Identified Barriers: Medication Side Effects, Impairment  Care Transitions Interventions    Pharmacist: Completed      Physical Therapy: Completed      Pulmonary Rehab: Completed Transportation Support: Completed      DME Assistance: Completed   Diabetes Education: Completed  Smoking Cessation: Completed     Occupational Therapy: Completed Social Work: Completed    Other Interventions:    Set up/Review Goals          Follow Up  No future appointments.     Xiao Santoro RN

## 2021-12-15 ENCOUNTER — COMMUNITY OUTREACH (OUTPATIENT)
Dept: OTHER | Age: 76
End: 2021-12-15

## 2021-12-15 DIAGNOSIS — Z59.89 UNINSURED: ICD-10-CM

## 2021-12-15 SDOH — ECONOMIC STABILITY - INCOME SECURITY: OTHER PROBLEMS RELATED TO HOUSING AND ECONOMIC CIRCUMSTANCES: Z59.89

## 2022-01-04 ENCOUNTER — NURSE TRIAGE (OUTPATIENT)
Dept: OTHER | Facility: CLINIC | Age: 77
End: 2022-01-04

## 2022-01-11 ENCOUNTER — CARE COORDINATION (OUTPATIENT)
Dept: CARE COORDINATION | Age: 77
End: 2022-01-11

## 2022-03-03 NOTE — CARE COORDINATION
This patient was permanently screened out of Care Coordination on 4/5/2022 for the following reason:

## 2022-03-31 LAB
BASOPHILS ABSOLUTE: NORMAL
BASOPHILS RELATIVE PERCENT: NORMAL
EOSINOPHILS ABSOLUTE: NORMAL
EOSINOPHILS RELATIVE PERCENT: NORMAL
HCT VFR BLD CALC: NORMAL %
HEMOGLOBIN: NORMAL
LYMPHOCYTES ABSOLUTE: NORMAL
LYMPHOCYTES RELATIVE PERCENT: NORMAL
MCH RBC QN AUTO: NORMAL PG
MCHC RBC AUTO-ENTMCNC: NORMAL G/DL
MCV RBC AUTO: NORMAL FL
MONOCYTES ABSOLUTE: NORMAL
MONOCYTES RELATIVE PERCENT: NORMAL
NEUTROPHILS ABSOLUTE: NORMAL
NEUTROPHILS RELATIVE PERCENT: NORMAL
PLATELET # BLD: NORMAL 10*3/UL
PMV BLD AUTO: NORMAL FL
RBC # BLD: NORMAL 10*6/UL
WBC # BLD: NORMAL 10*3/UL

## 2022-04-08 ENCOUNTER — NURSE TRIAGE (OUTPATIENT)
Dept: OTHER | Facility: CLINIC | Age: 77
End: 2022-04-08

## 2022-04-08 NOTE — TELEPHONE ENCOUNTER
REVIEW with RN.      Reason for Disposition   Chest pain lasting longer than 5 minutes and occurred in last 3 days (72 hours) (Exception: feels exactly the same as previously diagnosed heartburn and has accompanying sour taste in mouth)    Protocols used: CHEST PAIN-ADULT-OH

## 2022-04-15 ENCOUNTER — NURSE TRIAGE (OUTPATIENT)
Dept: OTHER | Facility: CLINIC | Age: 77
End: 2022-04-15

## 2022-04-15 NOTE — TELEPHONE ENCOUNTER
Test    Reason for Disposition   [1] Hiccups present > 3 hours AND [2] severe AND [3] no improvement using hiccup treatment per protocol    Protocols used: HICCUPS-ADULT-AH

## 2022-04-20 DIAGNOSIS — Z87.891 HISTORY OF CIGARETTE SMOKING: Primary | ICD-10-CM

## 2022-04-20 DIAGNOSIS — Z87.891 HISTORY OF PRIOR CIGARETTE SMOKING: ICD-10-CM

## 2022-04-27 DIAGNOSIS — I10 ESSENTIAL HYPERTENSION: Primary | ICD-10-CM

## 2022-04-28 DIAGNOSIS — R06.02 SHORTNESS OF BREATH: Primary | ICD-10-CM

## 2022-05-02 ENCOUNTER — CLINICAL DOCUMENTATION (OUTPATIENT)
Dept: CARDIOLOGY CLINIC | Age: 77
End: 2022-05-02

## 2022-05-02 NOTE — PROGRESS NOTES
CONCLUSION:   1. Stress EKG: non diagnostic due to pharmacologic infusion and atypical chest discomfort  2. Stress Echo Imaging: Abnormal due tolateral ischemia and apical ischemia  3. LV Systolic Function Abnormal at rest, no change post stress   4.  Risk Assessment: High risk      Comments:  Routine follow up recommended

## 2022-05-02 NOTE — PROGRESS NOTES
CONCLUSION:   1. Stress EKG: non diagnostic due to resting EKG abnormalities and atypical chest discomfort  2. SPECT Perfusion Imaging: Abnormal due to anterior ischemia and inferolateral ischemia  3. LV Systolic Function is normal  4. Risk Assessment: High risk    Comments:  Urgent evaluation by cardiologist recommended.

## 2022-05-04 ENCOUNTER — CARE COORDINATION (OUTPATIENT)
Dept: CARE COORDINATION | Age: 77
End: 2022-05-04

## 2022-05-04 NOTE — CARE COORDINATION
Advance Care Planning   Healthcare Decision Maker:    Primary Decision Maker: xxx, xxx - [facility] - [phone]    Secondary Decision Maker: navin, karen - Child - [phone]    Click here to complete Healthcare Decision Makers including selection of the Healthcare Decision Maker Relationship (ie \"Primary\"). Today we documented Decision Maker(s) consistent with Legal Next of Kin hierarchy.

## 2022-05-04 NOTE — CARE COORDINATION
As an effort to help optimize your health & focus on prioritizing your self-care this Summer season, we are providing you with tools & dietary tips to help guide you in this journey.       Sincerely,    [facility] Management Team

## 2022-05-06 DIAGNOSIS — R09.89 LABILE HYPERTENSION: Primary | ICD-10-CM

## 2022-05-09 ENCOUNTER — CLINICAL DOCUMENTATION (OUTPATIENT)
Dept: OTHER | Facility: CLINIC | Age: 77
End: 2022-05-09

## 2022-05-12 ENCOUNTER — DIRECT ADMIT ORDERS (OUTPATIENT)
Dept: CARDIOLOGY CLINIC | Age: 77
End: 2022-05-12

## 2022-05-16 ENCOUNTER — CLINICAL DOCUMENTATION (OUTPATIENT)
Dept: CARDIOLOGY CLINIC | Age: 77
End: 2022-05-16

## 2022-05-16 DIAGNOSIS — R55 NEAR SYNCOPE: ICD-10-CM

## 2022-05-16 DIAGNOSIS — I48.0 PAROXYSMAL ATRIAL FIBRILLATION (HCC): Primary | ICD-10-CM

## 2022-05-17 ENCOUNTER — PROCEDURE VISIT (OUTPATIENT)
Dept: CARDIOLOGY CLINIC | Age: 77
End: 2022-05-17

## 2022-05-17 DIAGNOSIS — I25.118 CORONARY ARTERY DISEASE OF NATIVE ARTERY OF NATIVE HEART WITH STABLE ANGINA PECTORIS (HCC): Primary | ICD-10-CM

## 2022-05-18 ENCOUNTER — TELEMEDICINE (OUTPATIENT)
Dept: INTERNAL MEDICINE CLINIC | Facility: CLINIC | Age: 77
End: 2022-05-18

## 2022-05-18 ENCOUNTER — PROCEDURE VISIT (OUTPATIENT)
Dept: INTERNAL MEDICINE CLINIC | Facility: CLINIC | Age: 77
End: 2022-05-18

## 2022-05-18 DIAGNOSIS — E11.9 DIABETES MELLITUS WITHOUT COMPLICATION (HCC): Primary | ICD-10-CM

## 2022-05-18 DIAGNOSIS — Z09 HOSPITAL DISCHARGE FOLLOW-UP: Primary | ICD-10-CM

## 2022-05-18 DIAGNOSIS — R06.02 SOB (SHORTNESS OF BREATH): Primary | ICD-10-CM

## 2022-05-18 DIAGNOSIS — J43.9 PULMONARY EMPHYSEMA, UNSPECIFIED EMPHYSEMA TYPE (HCC): ICD-10-CM

## 2022-05-18 DIAGNOSIS — I10 ESSENTIAL HYPERTENSION: Primary | ICD-10-CM

## 2022-05-18 DIAGNOSIS — J96.01 ACUTE HYPOXEMIC RESPIRATORY FAILURE (HCC): Primary | ICD-10-CM

## 2022-05-18 NOTE — PROGRESS NOTES
Post-Discharge Transitional Care Follow Up      Adult Gia   YOB: 1947    Date of Office Visit:  5/18/2022  Date of Hospital Admission: 1/19/20  Date of Hospital Discharge: 1/19/20  Readmission Risk Score (high >=14%. Medium >=10%):Readmission Risk Score: 6.9 ( )      Care management risk score Rising risk (score 2-5) and Complex Care (Scores >=6): 3     Non face to face  following discharge, date last encounter closed (first attempt may have been earlier): *No documented post hospital discharge outreach found in the last 14 days     Call initiated 2 business days of discharge: *No response recorded in the last 14 days     Hospital discharge follow-up  -     NM DISCHARGE MEDS RECONCILED W/ CURRENT OUTPATIENT MED LIST    fMedical Decision Making: straightforward  No follow-ups on file. On this date 5/18/2022 I have spent 30 minutes reviewing previous notes, test results and face to face with the patient discussing the diagnosis and importance of compliance with the treatment plan as well as documenting on the day of the visit. Subjective:   HPI    Inpatient course: Discharge summary reviewed- see chart. Interval history/Current status: stable    Patient Active Problem List   Diagnosis    Chronic obstructive lung disease ([address]    Coronary arteriosclerosis    Asthma    Diabetes mellitus without complication ([address]    Essential hypertension    Appendicitis    Acute hypoxemic respiratory failure ([address]       Medications listed as ordered at the time of discharge from hospital     Medication List      Notice    This visit is during an admission. Changes to the med list made in this visit will be reflected in the After Visit Summary of the admission. Medications marked \"taking\" at this time  No outpatient medications have been marked as taking for the 5/18/22 encounter (Telemedicine) with RAMEZ Morrow.         Medications patient taking as of now reconciled against medications ordered at time of hospital discharge: Yes    Review of Systems    Objective: There were no vitals taken for this visit. Physical Exam    An electronic signature was used to authenticate this note.   --RAMEZ Fernandes, PA

## 2022-05-18 NOTE — PROGRESS NOTES
PROGRESS NOTE    SUBJECTIVE:   Chief Complaint   Patient presents with    Diabetes        HPI    Past Medical History, Past Surgical History, Family history, Social History, and Medications were all reviewed with the patient today and updated as necessary. No current outpatient medications on file. No current facility-administered medications for this visit. Allergies   Allergen Reactions    Codeine Anaphylaxis    Diphenhydramine     Amitriptyline Hcl     Atorvastatin Calcium     Bee Venom      Other reaction(s): (unknown)    Casein Hives    Ceftriaxone     Celecoxib Hives    Chlorpheniramine Maleate Other (See Comments)    Cocoa     Magnesium Hydroxide     Morphine Other (See Comments)    Nebivolol     Petrolatum     Strawberry Extract     Sulfamethoxazole Itching    Trimethoprim     Clavulanic Acid Diarrhea, Nausea And Vomiting and Nausea Only    Penicillins Diarrhea and Nausea And Vomiting               Review of Systems      OBJECTIVE:  There were no vitals taken for this visit. Physical Exam     Medical problems and test results were reviewed with the patient today.      Recent Results (from the past 672 hour(s))   AMB POC SPIROMETRY    Collection Time: 04/25/22 12:00 AM   Result Value Ref Range    FEV 1 , POC 3.75 L    FEV1 % Pred POC 84 %    FVC, POC 2.75     FVC % Pred POC 84 %    FEV1/FVC, POC 55    Spirometry Without Bronchodilator    Collection Time: 05/15/22 12:00 AM   Result Value Ref Range    FVC 1,323 L    FVC Pred 3,123 L    FVC %Pred-Pre 322 %    FEV1 23 L    FEV1 Pred 321 L    FEV1 %Pred-Pre 32 %    FEV1/ %    FEV1/FVC Pred      FEV1/FVC %Pred-Pre      FEF 25-75%-Pre      FEF 25-75% Pred      FEF 25-75% %Pred-Pre      Expiratory Time      PEF-Pre      PEF Pred      PEF %Pred-Pre     Spirometry Without Bronchodilator    Collection Time: 05/16/22 10:38 AM   Result Value Ref Range    FVC 75 L    FVC Pred 65 L    FVC %Pred-Pre 564 %    FEV1 5,465 L    FEV1 Pred 5,465 L    FEV1 %Pred-Pre 7.84 %    FEV1/FVC 85 %    FEV1/FVC Pred 213 %    FEV1/FVC %Pred-Pre 2,161 %    FEF 25-75%-Pre 651 L/sec    FEF 25-75% Pred      FEF 25-75% %Pred-Pre      Expiratory Time      PEF-Pre      PEF Pred      PEF %Pred-Pre     Spirometry Without Bronchodilator    Collection Time: 05/17/22 11:11 AM   Result Value Ref Range    FVC 4,561 L    FVC Pred 151 L    FVC %Pred-Pre 212 %    FEV1 213 L    FEV1 Pred 213 L    FEV1 %Pred-Pre 2,123 %    FEV1/FVC 2,121 %    FEV1/FVC Pred      FEV1/FVC %Pred-Pre      FEF 25-75%-Pre      FEF 25-75% Pred      FEF 25-75% %Pred-Pre      Expiratory Time      PEF-Pre      PEF Pred      PEF %Pred-Pre     CBC    Collection Time: 05/18/22 12:00 AM   Result Value Ref Range    WBC 12 10^3/mL    Hemoglobin 12 12.0 - 16.0 g/dL    Hematocrit 35 (A) 36 - 46 %    Platelets 230 K/µL    Neutrophils % 5 %    Lymphocytes % 5 %    Monocytes % 5 %    Eosinophils % 5 %    Basophils % 5 %    Neutrophils Absolute 5 /µL    Lymphocytes Absolute 5 /µL    Monocytes Absolute 5 /µL    Eosinophils Absolute 5 /µL    Basophils Absolute 5 /µL    RBC 5 10^6/µL    MCV 5 fL    MCH 5 pg    MCHC 5 g/dL    MPV 5 fL         ASSESSMENT and PLAN    Adult was seen today for diabetes. Diagnoses and all orders for this visit:    Diabetes mellitus without complication ([facility]          No follow-up provider specified.

## 2022-05-18 NOTE — PROGRESS NOTES
HPI    Past Medical History, Past Surgical History, Family history, Social History, and Medications were all reviewed with the patient today and updated as necessary. No current outpatient medications on file. No current facility-administered medications for this visit. Allergies   Allergen Reactions    Codeine Anaphylaxis    Diphenhydramine     Amitriptyline Hcl     Atorvastatin Calcium     Bee Venom      Other reaction(s): (unknown)    Casein Hives    Ceftriaxone     Celecoxib Hives    Chlorpheniramine Maleate Other (See Comments)    Cocoa     Magnesium Hydroxide     Morphine Other (See Comments)    Nebivolol     Petrolatum     Strawberry Extract     Sulfamethoxazole Itching    Trimethoprim     Clavulanic Acid Diarrhea, Nausea And Vomiting and Nausea Only    Penicillins Diarrhea and Nausea And Vomiting     Patient Active Problem List   Diagnosis    Chronic obstructive lung disease ([facility]    Coronary arteriosclerosis    Asthma    Diabetes mellitus without complication ([facility]    Essential hypertension    Appendicitis    Acute hypoxemic respiratory failure (HCC)         Review of Systems      OBJECTIVE:  There were no vitals taken for this visit. Physical Exam     Medical problems and test results were reviewed with the patient today.      Recent Results (from the past 672 hour(s))   AMB POC SPIROMETRY    Collection Time: 04/25/22 12:00 AM   Result Value Ref Range    FEV 1 , POC 3.75 L    FEV1 % Pred POC 84 %    FVC, POC 2.75     FVC % Pred POC 84 %    FEV1/FVC, POC 55    Spirometry Without Bronchodilator    Collection Time: 05/15/22 12:00 AM   Result Value Ref Range    FVC 1,323 L    FVC Pred 3,123 L    FVC %Pred-Pre 322 %    FEV1 23 L    FEV1 Pred 321 L    FEV1 %Pred-Pre 32 %    FEV1/ %    FEV1/FVC Pred      FEV1/FVC %Pred-Pre      FEF 25-75%-Pre      FEF 25-75% Pred      FEF 25-75% %Pred-Pre      Expiratory Time      PEF-Pre      PEF Pred      PEF %Pred-Pre Spirometry Without Bronchodilator    Collection Time: 05/16/22 10:38 AM   Result Value Ref Range    FVC 75 L    FVC Pred 65 L    FVC %Pred-Pre 564 %    FEV1 5,465 L    FEV1 Pred 5,465 L    FEV1 %Pred-Pre 7.84 %    FEV1/FVC 85 %    FEV1/FVC Pred 213 %    FEV1/FVC %Pred-Pre 2,161 %    FEF 25-75%-Pre 651 L/sec    FEF 25-75% Pred      FEF 25-75% %Pred-Pre      Expiratory Time      PEF-Pre      PEF Pred      PEF %Pred-Pre     Spirometry Without Bronchodilator    Collection Time: 05/17/22 11:11 AM   Result Value Ref Range    FVC 4,561 L    FVC Pred 151 L    FVC %Pred-Pre 212 %    FEV1 213 L    FEV1 Pred 213 L    FEV1 %Pred-Pre 2,123 %    FEV1/FVC 2,121 %    FEV1/FVC Pred      FEV1/FVC %Pred-Pre      FEF 25-75%-Pre      FEF 25-75% Pred      FEF 25-75% %Pred-Pre      Expiratory Time      PEF-Pre      PEF Pred      PEF %Pred-Pre     CBC    Collection Time: 05/18/22 12:00 AM   Result Value Ref Range    WBC 12 10^3/mL    Hemoglobin 12 12.0 - 16.0 g/dL    Hematocrit 35 (A) 36 - 46 %    Platelets 372 K/µL    Neutrophils % 5 %    Lymphocytes % 5 %    Monocytes % 5 %    Eosinophils % 5 %    Basophils % 5 %    Neutrophils Absolute 5 /µL    Lymphocytes Absolute 5 /µL    Monocytes Absolute 5 /µL    Eosinophils Absolute 5 /µL    Basophils Absolute 5 /µL    RBC 5 10^6/µL    MCV 5 fL    MCH 5 pg    MCHC 5 g/dL    MPV 5 fL         ASSESSMENT and PLAN    Adult was seen today for back pain. Diagnoses and all orders for this visit:    Essential hypertension    Pulmonary emphysema, unspecified emphysema type ([facility]          No follow-up provider specified.

## 2022-05-18 NOTE — PROGRESS NOTES
[address] [facility]. [address] [facility], [address] [facility]  [phone]    Patient Name:  Adult Gia  YOB: 1947    Office Visit 5/18/2022    CHIEF COMPLAINT:    No chief complaint on file. HISTORY OF PRESENT ILLNESS:    I had the pleasure of seeing Ms. Mindi Nielsen in our clinic today. Ms. Sagar Montero is a 70 y.o. female who presents with a history of ***    Past Medical History:   Diagnosis Date    Depression     GERD (gastroesophageal reflux disease)     Hypertension     Osteoarthritis        Patient Active Problem List   Diagnosis    Chronic obstructive lung disease [facility]    Coronary arteriosclerosis    Asthma    Diabetes mellitus without complication ([facility]    Essential hypertension    Appendicitis    Acute hypoxemic respiratory failure (HCC)       Past Surgical History:   Procedure Laterality Date    CHOLECYSTECTOMY      HYSTERECTOMY, VAGINAL      MASTECTOMY, BILATERAL Bilateral     UPPER GASTROINTESTINAL ENDOSCOPY         [unfilled]    Social History     Socioeconomic History    Marital status:      Spouse name: Adin Oneill Number of children: 1    Years of education: 15    Highest education level: High school graduate   Occupational History    Occupation: [employer]     Employer: [employer]   Tobacco Use    Smoking status: Current Every Day Smoker     Packs/day: 2.00     Years: 20.00     Pack years: 40.00     Types: Cigarettes     Start date: 12/1/2010    Smokeless tobacco: Never Used    Tobacco comment: Provided [id number]   Vaping Use    Vaping Use: Never used   Substance and Sexual Activity    Alcohol use: Not Currently     Alcohol/week: 2.0 standard drinks     Types: 2 Shots of liquor per week    Drug use: Not Currently    Sexual activity: Yes     Partners: Male     Birth control/protection: Post-menopausal   Other Topics Concern    Not on file   Social History Narrative    Food insecurity identified. Food pantry list supplied to patient    Soup kitchen list supplied    Referral to [place]     Social [facility]     Financial Resource Strain:     Difficulty of Paying Living Expenses: Not on file   Food Insecurity:     Worried About [address] in the Last Year: Not on file    Eric of Food in the Last Year: Not on file   Transportation Needs:     Lack of Transportation (Medical): Not on file    Lack of Transportation (Non-Medical):  Not on file   Physical Activity:     Days of Exercise per Week: Not on file    Minutes of Exercise per Session: Not on file   Stress:     Feeling of Stress : Not on file   Social Connections:     Frequency of Communication with Friends and Family: Not on file    Frequency of Social Gatherings with Friends and Family: Not on file    Attends Nondenominational Services: Not on file    Active Member of [address] [place] [address] [employer] or Organizations: Not on file    Attends Club or Organization Meetings: Not on file    Marital Status: Not on file   Intimate Partner Violence:     Fear of Current or Ex-Partner: Not on file    Emotionally Abused: Not on file    Physically Abused: Not on file    Sexually Abused: Not on file   Housing Stability:     Unable to Pay for Housing in the Last Year: Not on file    Number of [place] in the Last Year: Not on file    Unstable Housing in the Last Year: Not on file       Exposure History:  Second Hand Smoke Exposure: {YES/NO:94606}  Birds: {YES/NO:91261}  Asbestos: {YES/NO:07078}  TB: {YES/NO:69798}  Hot Tubs/Humidifier: {YES/NO:46687}  Organic/Inorganic Dusts: {YES/NO:47945}  Molds: {YES/NO:60977}  Occupation/Hobbies: ***    Family History   Problem Relation Age of Onset    High Blood Pressure Mother     High Cholesterol Mother     Arthritis Mother     Diabetes Father     Heart Disease Father        Allergies   Allergen Reactions    Codeine Anaphylaxis    Diphenhydramine     Amitriptyline Hcl     Atorvastatin Calcium     Bee Venom Other reaction(s): (unknown)    Casein Hives    Ceftriaxone     Celecoxib Hives    Chlorpheniramine Maleate Other (See Comments)    Cocoa     Magnesium Hydroxide     Morphine Other (See Comments)    Nebivolol     Petrolatum     Strawberry Extract     Sulfamethoxazole Itching    Trimethoprim     Clavulanic Acid Diarrhea, Nausea And Vomiting and Nausea Only    Penicillins Diarrhea and Nausea And Vomiting       No current outpatient medications on file. No current facility-administered medications for this visit. REVIEW OF SYSTEMS:    Review of Systems      PHYSICAL EXAM:  There were no vitals filed for this visit. GENERAL APPEARANCE:  The patient is normal weight and in no respiratory distress. HEENT:  PERRL. Conjunctivae unremarkable. Nasal mucosa is without epistaxis, exudate, or polyps. Gums and dentition are unremarkable. There is no oropharyngeal narrowing. TMs are clear. NECK/LYMPHATIC:  Symmetrical with no elevation of jugular venous pulsation. Trachea midline. No thyroid enlargement. No cervical adenopathy. LUNGS:  Normal respiratory effort with symmetrical lung expansion. Breath sounds ***. HEART:  There is a regular rate and rhythm. No murmur, rub, or gallop. ABDOMEN:  Soft and non-tender. No hepatosplenomegaly. Bowel sounds are normal.    NEURO/PSYCH:  The patient is alert and oriented to person, place, and time. Memory appears intact and mood is normal.  No gross sensorimotor deficits are present. MS/SKIN:  There is no edema in the lower extremities. No rashes, bruises, lesions, ulcers visible. DIAGNOSTIC TESTS:  CT of chest:  ***  @[facility]([id number])@    No results found for this or any previous visit. No valid procedures specified. No results found for this or any previous visit. CXR:  ***  @LASTIMGIMPONLY([id number])@      PET/CT:   @[facility]([id number])@  No valid procedures specified.     Exercise oximetry:  ***    Spirometry: @LASTPROCAMB([id number])@  @LASTPROCAMB([id number])@   @LASTPROCAMB([id number])@   @LASTPROCAMB([id number])@     Office Spirometry Results Latest Ref Rng & Units 5/17/2022 5/16/2022 5/15/2022   FEF 25-75%-PRE L/sec - 651 -   FVC L 4,561 75 1,323   FEV1 L 213 5,465 23   FEV1 %PRED-PRE % 2,123 7.84 32   FVC %PRED-PRE % 212 564 322   FEV1/FVC % 2,121 85 223   FEV1/FVC PRED % - 213 -           Date:    ***        FVC    ***        FEV1    ***        FEV1/FVC    ***        FEF 25-75%    ***        Bronchodilator Response    ***        TLC    ***        RV    ***        DLCO    ***          Echo:  No valid procedures specified. ASSESSMENT:  (Medical Decision Making)  {No diagnosis found. (Refresh or delete this SmartLink)}     ***    PLAN:  ***      Portions of this note were created using voice recognition software. As such, error of speech recognition may have occurred. No orders of the defined types were placed in this encounter.         Rocío Deal MA  Electronically signed

## 2022-05-19 ENCOUNTER — CARE COORDINATION (OUTPATIENT)
Dept: CARE COORDINATION | Age: 77
End: 2022-05-19

## 2022-05-19 ENCOUNTER — TELEPHONE (OUTPATIENT)
Dept: ENDOCRINOLOGY | Age: 77
End: 2022-05-19

## 2022-05-19 DIAGNOSIS — R07.89 CHEST DISCOMFORT: Primary | ICD-10-CM

## 2022-05-19 NOTE — TELEPHONE ENCOUNTER
Lio Hernandez MD, [address]            Reason for visit:       ASSESSMENT AND PLAN:        [unfilled]    History of Present Illness:    SBYAGE@    @VS@  Wt Readings from Last 3 Encounters:   06/02/20 198 lb (89.8 kg)   05/28/20 181 lb (82.1 kg)   05/28/20 198 lb (89.8 kg)       [unfilled]    No orders of the defined types were placed in this encounter. Current Outpatient Medications   Medication Sig Dispense Refill    meloxicam (MOBIC) 7.5 MG tablet Take 1 tablet by mouth daily 30 tablet 5    acetaminophen (TYLENOL) 325 MG tablet Take 325 mg by mouth every 4 hours as needed for Pain      LOW-DOSE ASPIRIN PO Take by mouth      Cholecalciferol (VITAMIN D) 2000 units CAPS capsule Take by mouth      amLODIPine (NORVASC) 5 MG tablet Take 1 tablet by mouth       lisinopril-hydrochlorothiazide (ZESTORETIC) 20-12.5 MG per tablet Take 1 tablet by mouth      sertraline (ZOLOFT) 25 MG tablet Take 1 tablet by mouth      Omeprazole Magnesium (PRILOSEC) 2.5 MG PACK Take 5 mg by mouth      Melatonin 5 MG CAPS Take 1 capsule by mouth      medical marijuana Take by mouth as needed. furosemide (LASIX) 10 MG/ML solution Take by mouth daily        No current facility-administered medications for this visit. Lio Hernandez MD, FACE      Portions of this note were generated with the assistance of voice recognition software. As such, some errors in transcription may be present.

## 2022-05-20 ENCOUNTER — PROCEDURE VISIT (OUTPATIENT)
Dept: UROLOGY | Age: 77
End: 2022-05-20

## 2022-05-20 ENCOUNTER — PROCEDURE VISIT (OUTPATIENT)
Dept: INTERNAL MEDICINE CLINIC | Facility: CLINIC | Age: 77
End: 2022-05-20

## 2022-05-20 DIAGNOSIS — E11.9 DIABETES MELLITUS WITHOUT COMPLICATION (HCC): ICD-10-CM

## 2022-05-20 DIAGNOSIS — I10 ESSENTIAL HYPERTENSION: Primary | ICD-10-CM

## 2022-05-20 DIAGNOSIS — Z00.00 INITIAL MEDICARE ANNUAL WELLNESS VISIT: Primary | ICD-10-CM

## 2022-05-22 ENCOUNTER — HOSPITAL ENCOUNTER (OUTPATIENT)
Dept: LAB | Age: 77
Setting detail: SPECIMEN
Discharge: HOME | End: 2022-05-25

## 2022-05-22 ENCOUNTER — HOSPITAL ENCOUNTER (OUTPATIENT)
Dept: LAB | Age: 77
Discharge: HOME | End: 2022-05-25

## 2022-05-22 ENCOUNTER — HOSPITAL ENCOUNTER (OUTPATIENT)
Dept: INFUSION THERAPY | Age: 77
Discharge: HOME | End: 2022-05-22

## 2022-05-22 DIAGNOSIS — R53.83 FATIGUE DUE TO TREATMENT: Primary | ICD-10-CM

## 2022-05-22 DIAGNOSIS — R53.83 FATIGUE DUE TO TREATMENT: ICD-10-CM

## 2022-05-22 LAB
ALBUMIN SERPL-MCNC: 3.8 G/DL (ref 3.2–4.6)
ALBUMIN/GLOB SERPL: 1.1 {RATIO} (ref 1.2–3.5)
ALP SERPL-CCNC: 65 U/L (ref 50–136)
ALT SERPL-CCNC: 18 U/L (ref 12–65)
ANION GAP SERPL CALC-SCNC: 8 MMOL/L (ref 7–16)
AST SERPL-CCNC: 17 U/L (ref 15–37)
BASOPHILS # BLD: 0.1 K/UL (ref 0–0.2)
BASOPHILS NFR BLD: 1 % (ref 0–2)
BILIRUB SERPL-MCNC: 0.5 MG/DL (ref 0.2–1.1)
BUN SERPL-MCNC: 32 MG/DL (ref 8–23)
CALCIUM SERPL-MCNC: 10.3 MG/DL (ref 8.3–10.4)
CHLORIDE SERPL-SCNC: 107 MMOL/L (ref 98–107)
CO2 SERPL-SCNC: 25 MMOL/L (ref 21–32)
CREAT SERPL-MCNC: 2.1 MG/DL (ref 0.6–1)
DIFFERENTIAL METHOD BLD: ABNORMAL
EOSINOPHIL # BLD: 0.1 K/UL (ref 0–0.8)
EOSINOPHIL NFR BLD: 2 % (ref 0.5–7.8)
ERYTHROCYTE [DISTWIDTH] IN BLOOD BY AUTOMATED COUNT: 12 %
ERYTHROCYTE [DISTWIDTH] IN BLOOD BY AUTOMATED COUNT: 17.2 % (ref 11.9–14.6)
GLOBULIN SER CALC-MCNC: 3.4 G/DL (ref 2.3–3.5)
GLUCOSE SERPL-MCNC: 96 MG/DL (ref 65–100)
HCT VFR BLD AUTO: 42.6 % (ref 35.8–46.3)
HCT VFR BLD AUTO: 43.3 % (ref 35.8–46.3)
HGB BLD-MCNC: 13.5 G/DL (ref 11.7–15.4)
HGB BLD-MCNC: 14.6 G/DL (ref 11.7–15.4)
IMM GRANULOCYTES # BLD AUTO: 0.1 K/UL (ref 0–0.5)
IMM GRANULOCYTES NFR BLD AUTO: 1 % (ref 0–5)
LYMPHOCYTES # BLD: 1.7 K/UL (ref 0.5–4.6)
LYMPHOCYTES NFR BLD: 29 % (ref 13–44)
MCH RBC QN AUTO: 34.6 PG (ref 26.1–32.9)
MCH RBC QN AUTO: 36.2 PG (ref 26.1–32.9)
MCHC RBC AUTO-ENTMCNC: 33.7 G/DL (ref 31.4–35)
MCHC RBC AUTO-ENTMCNC: 35.8 G/DL
MCV RBC AUTO: 102.6 FL (ref 79.6–97.8)
MCV RBC AUTO: 98 FL (ref 79.6–97.8)
MONOCYTES # BLD: 1.1 K/UL (ref 0.1–1.3)
MONOCYTES NFR BLD: 18 % (ref 4–12)
NEUTS SEG # BLD: 2.8 K/UL (ref 1.7–8.2)
NEUTS SEG NFR BLD: 48 % (ref 43–78)
NRBC # BLD: 0 K/UL
NRBC # BLD: 0 K/UL (ref 0–0.2)
PLATELET # BLD AUTO: 180 K/UL (ref 150–450)
PLATELET # BLD AUTO: 201 K/UL
PMV BLD AUTO: 11 FL (ref 9.4–12.3)
PMV BLD AUTO: 13 FL (ref 9.4–12.3)
POTASSIUM SERPL-SCNC: 3.8 MMOL/L (ref 3.5–5.1)
PROT SERPL-MCNC: 7.2 G/DL (ref 6.3–8.2)
RBC # BLD AUTO: 4.22 M/UL (ref 4.05–5.2)
RBC # BLD AUTO: 4.23 M/UL
SODIUM SERPL-SCNC: 140 MMOL/L (ref 136–145)
WBC # BLD AUTO: 5.9 K/UL (ref 4.3–11.1)
WBC # BLD AUTO: 6.3 K/UL (ref 4.3–11.1)

## 2022-05-23 ENCOUNTER — FOLLOWUP TELEPHONE ENCOUNTER (OUTPATIENT)
Dept: CARDIOLOGY CLINIC | Age: 77
End: 2022-05-23

## 2022-05-23 ENCOUNTER — OFFICE VISIT (OUTPATIENT)
Dept: FAMILY MEDICINE CLINIC | Facility: CLINIC | Age: 77
End: 2022-05-23

## 2022-05-23 ENCOUNTER — HOSPITAL ENCOUNTER (OUTPATIENT)
Dept: LAB | Age: 77
Discharge: HOME | End: 2022-05-26

## 2022-05-23 ENCOUNTER — PROCEDURE VISIT (OUTPATIENT)
Dept: INTERNAL MEDICINE CLINIC | Facility: CLINIC | Age: 77
End: 2022-05-23

## 2022-05-23 ENCOUNTER — TELEMEDICINE (OUTPATIENT)
Dept: INTERNAL MEDICINE CLINIC | Facility: CLINIC | Age: 77
End: 2022-05-23

## 2022-05-23 ENCOUNTER — PREP FOR PROCEDURE (OUTPATIENT)
Dept: OPHTHALMOLOGY | Age: 77
End: 2022-05-23

## 2022-05-23 ENCOUNTER — PROCEDURE VISIT (OUTPATIENT)
Dept: CARDIOLOGY CLINIC | Age: 77
End: 2022-05-23

## 2022-05-23 DIAGNOSIS — N30.00 ACUTE CYSTITIS WITHOUT HEMATURIA: ICD-10-CM

## 2022-05-23 DIAGNOSIS — I10 ESSENTIAL HYPERTENSION: ICD-10-CM

## 2022-05-23 DIAGNOSIS — E11.9 DIABETES MELLITUS WITHOUT COMPLICATION (HCC): ICD-10-CM

## 2022-05-23 DIAGNOSIS — J43.9 PULMONARY EMPHYSEMA, UNSPECIFIED EMPHYSEMA TYPE (HCC): Primary | ICD-10-CM

## 2022-05-23 DIAGNOSIS — J44.9 CHRONIC OBSTRUCTIVE PULMONARY DISEASE, UNSPECIFIED COPD TYPE (HCC): Primary | ICD-10-CM

## 2022-05-23 DIAGNOSIS — I25.10 CORONARY ARTERIOSCLEROSIS: Primary | ICD-10-CM

## 2022-05-23 DIAGNOSIS — R73.9 HYPERGLYCEMIA: ICD-10-CM

## 2022-05-23 DIAGNOSIS — J43.9 PULMONARY EMPHYSEMA, UNSPECIFIED EMPHYSEMA TYPE (HCC): ICD-10-CM

## 2022-05-23 DIAGNOSIS — Z00.00 INITIAL MEDICARE ANNUAL WELLNESS VISIT: Primary | ICD-10-CM

## 2022-05-23 DIAGNOSIS — I06.0 RHEUMATIC AORTIC STENOSIS: Primary | ICD-10-CM

## 2022-05-23 DIAGNOSIS — Z91.81 AT HIGH RISK FOR FALLS: Primary | ICD-10-CM

## 2022-05-23 DIAGNOSIS — I10 PRIMARY HYPERTENSION: ICD-10-CM

## 2022-05-23 LAB
EXPIRATORY TIME: NORMAL
FEF 25-75% %PRED-PRE: NORMAL
FEF 25-75% PRED: NORMAL
FEF 25-75%-PRE: NORMAL
FEV1 %PRED-PRE: 75 %
FEV1 PRED: 75 L
FEV1/FVC %PRED-PRE: NORMAL
FEV1/FVC PRED: NORMAL
FEV1/FVC: 2.27 %
FEV1: 120 L
FVC %PRED-PRE: 2.27 %
FVC PRED: 114 L
FVC: 3.01 L
PEF %PRED-PRE: NORMAL
PEF PRED: NORMAL
PEF-PRE: NORMAL

## 2022-05-23 ASSESSMENT — PULMONARY FUNCTION TESTS
FEV1_PREDICTED: 75
FVC_PERCENT_PREDICTED_PRE: 2.27
FVC_PREDICTED: 114
FEV1/FVC: 2.27
FVC: 3.01
FEV1: 120
FEV1_PERCENT_PREDICTED_PRE: 75

## 2022-05-23 ASSESSMENT — ENCOUNTER SYMPTOMS: SHORTNESS OF BREATH: 0

## 2022-05-23 NOTE — PROGRESS NOTES
No results found for: CHOL  No results found for: TRIG  No results found for: HDL  No results found for: LDLCHOLESTEROL, LDLCALC  No results found for: LABVLDL, VLDL  No results found for: [facility]     Lab Results   Component Value Date    ALKPHOS 65 05/22/2022    ALT 18 05/22/2022    AST 17 05/22/2022    PROT 7.2 05/22/2022    BILITOT 0.5 05/22/2022    LABALBU 3.8 05/22/2022

## 2022-05-23 NOTE — PATIENT INSTRUCTIONS
Personalized Preventive Plan for Adult [facility] - 5/23/2022  Medicare offers a range of preventive health benefits. Some of the tests and screenings are paid in full while other may be subject to a deductible, co-insurance, and/or copay. Some of these benefits include a comprehensive review of your medical history including lifestyle, illnesses that may run in your family, and various assessments and screenings as appropriate. After reviewing your medical record and screening and assessments performed today your provider may have ordered immunizations, labs, imaging, and/or referrals for you. A list of these orders (if applicable) as well as your Preventive Care list are included within your After Visit Summary for your review. Other Preventive Recommendations:    · A preventive eye exam performed by an eye specialist is recommended every 1-2 years to screen for glaucoma; cataracts, macular degeneration, and other eye disorders. · A preventive dental visit is recommended every 6 months. · Try to get at least 150 minutes of exercise per week or 10,000 steps per day on a pedometer . · Order or download the FREE \"Exercise & Physical Activity: Your Everyday Guide\" from [employer]. Call 7-920.177.9187 or search [employer] online. · You need 2363-1294 mg of calcium and 6714-6070 IU of vitamin D per day. It is possible to meet your calcium requirement with diet alone, but a vitamin D supplement is usually necessary to meet this goal.  · When exposed to the sun, use a sunscreen that protects against both UVA and UVB radiation with an SPF of 30 or greater. Reapply every 2 to 3 hours or after sweating, drying off with a towel, or swimming. · Always wear a seat belt when traveling in a car. Always wear a helmet when riding a bicycle or motorcycle. Personalized Preventive Plan for Adult [facility] - 5/23/2022  Medicare offers a range of preventive health benefits.  Some of the tests and screenings are paid in full while other may be subject to a deductible, co-insurance, and/or copay. Some of these benefits include a comprehensive review of your medical history including lifestyle, illnesses that may run in your family, and various assessments and screenings as appropriate. After reviewing your medical record and screening and assessments performed today your provider may have ordered immunizations, labs, imaging, and/or referrals for you. A list of these orders (if applicable) as well as your Preventive Care list are included within your After Visit Summary for your review. Other Preventive Recommendations:    A preventive eye exam performed by an eye specialist is recommended every 1-2 years to screen for glaucoma; cataracts, macular degeneration, and other eye disorders. A preventive dental visit is recommended every 6 months. Try to get at least 150 minutes of exercise per week or 10,000 steps per day on a pedometer . Order or download the FREE \"Exercise & Physical Activity: Your Everyday Guide\" from The [employer]. Call 7-906.663.4671 or search The [employer] online. You need 4944-6935 mg of calcium and 7075-5322 IU of vitamin D per day. It is possible to meet your calcium requirement with diet alone, but a vitamin D supplement is usually necessary to meet this goal.  When exposed to the sun, use a sunscreen that protects against both UVA and UVB radiation with an SPF of 30 or greater. Reapply every 2 to 3 hours or after sweating, drying off with a towel, or swimming. Always wear a seat belt when traveling in a car. Always wear a helmet when riding a bicycle or motorcycle.

## 2022-05-23 NOTE — PROGRESS NOTES
Medicare Annual Wellness Visit    Adult Joselito An is here for Medicare AWV    Assessment & Plan   Initial Medicare annual wellness visit  Acute cystitis without hematuria  -     [facility] OFFICE OUTPATIENT VISIT 15 MINUTES [13474]  Hyperglycemia  -     [facility] OFFICE OUTPATIENT VISIT 15 MINUTES [76696]      Recommendations for Preventive Services Due: see orders and patient instructions/AVS.  Recommended screening schedule for the next 5-10 years is provided to the patient in written form: see Patient Instructions/AVS.     Return in 1 day (on 5/24/2022) for Medicare Annual Wellness Visit in 1 year. Subjective   The following acute and/or chronic problems were also addressed today:  gout    Patient's complete Health Risk Assessment and screening values have been reviewed and are found in Flowsheets. The following problems were reviewed today and where indicated follow up appointments were made and/or referrals ordered.     Positive Risk Factor Screenings with Interventions:    Fall Risk:        Fall Risk Interventions:    · Home safety tips provided      Tobacco Use:     Tobacco Use: High Risk    Smoking Tobacco Use: Current Every Day Smoker    Smokeless Tobacco Use: Never Used     E-Cigarettes/Vaping Use     Questions Responses    E-Cigarette/Vaping Use Never User    Start Date     Passive Exposure     Quit Date     Counseling Given     Comments Unknown        Substance Use - Tobacco Interventions:  tobacco cessation tips and resources provided         General Health and ACP:       Advance Directives     Power of  Living Will ACP-Advance Directive ACP-Power of     Not on File Not on File Not on File Not on File      General Health Risk Interventions:  · none    Health Habits/Nutrition:     Physical Activity:     Days of Exercise per Week: Not on file   [employer] of Exercise per Session: Not on file                Health Habits/Nutrition Interventions:  · Inadequate physical activity:  patient agrees to exercise for at least 150 minutes/week             Objective      Patient-Reported Vitals  No data recorded            Allergies   Allergen Reactions    Codeine Anaphylaxis    Diphenhydramine     Amitriptyline Hcl     Atorvastatin Calcium     Bee Venom      Other reaction(s): (unknown)    Casein Hives    Ceftriaxone     Celecoxib Hives    Chlorpheniramine Maleate Other (See Comments)    Cocoa     Magnesium Hydroxide     Morphine Other (See Comments)    Nebivolol     Petrolatum     Strawberry Extract     Sulfamethoxazole Itching    Trimethoprim     Clavulanic Acid Diarrhea, Nausea And Vomiting and Nausea Only    Penicillins Diarrhea and Nausea And Vomiting     Prior to Visit Medications    Medication Sig Taking? Authorizing Provider   meloxicam (MOBIC) 7.5 MG tablet Take 1 tablet by mouth daily  Edgardo Nicholas MD   acetaminophen (TYLENOL) 325 MG tablet Take 325 mg by mouth every 4 hours as needed for Pain  Historical Provider, MD   LOW-DOSE ASPIRIN PO Take by mouth  Historical Provider, MD   Cholecalciferol (VITAMIN D) 2000 units CAPS capsule Take by mouth  Historical Provider, MD   amLODIPine (NORVASC) 5 MG tablet Take 1 tablet by mouth   Historical Provider, MD   lisinopril-hydrochlorothiazide (ZESTORETIC) 20-12.5 MG per tablet Take 1 tablet by mouth  Historical Provider, MD   sertraline (ZOLOFT) 25 MG tablet Take 1 tablet by mouth  Historical Provider, MD   Omeprazole Magnesium (PRILOSEC) 2.5 MG PACK Take 5 mg by mouth  Historical Provider, MD   Melatonin 5 MG CAPS Take 1 capsule by mouth  Historical Provider, MD   medical marijuana Take by mouth as needed.   Historical Provider, MD   furosemide (LASIX) 10 MG/ML solution Take by mouth daily   Historical Provider, MD Mcghee (Including outside providers/suppliers regularly involved in providing care):   Patient Care Team:  None Provider as PCP - General  Provider Test, MD  Provider MD Regla as Primary Care Provider  Silvia Campos RN as Ambulatory Care Manager     Reviewed and updated this visit:            Adult Gia, was evaluated through a synchronous (real-time) audio-video encounter. The patient (or guardian if applicable) is aware that this is a billable service, which includes applicable co-pays. This Virtual Visit was conducted with patient's (and/or legal guardian's) consent. The visit was conducted pursuant to the emergency declaration under the [address] [place] [address] authority and the [employer] and [employer] General Act. Patient identification was verified, and a caregiver was present when appropriate. The patient was located at home in a state where the provider was licensed to provide care.

## 2022-05-24 DIAGNOSIS — R06.02 SOB (SHORTNESS OF BREATH): Primary | ICD-10-CM

## 2022-05-24 NOTE — PROGRESS NOTES
HPI    Past Medical History, Past Surgical History, Family history, Social History, and Medications were all reviewed with the patient today and updated as necessary. Current Outpatient Medications   Medication Sig Dispense Refill    meloxicam (MOBIC) 7.5 MG tablet Take 1 tablet by mouth daily 30 tablet 5    acetaminophen (TYLENOL) 325 MG tablet Take 325 mg by mouth every 4 hours as needed for Pain      LOW-DOSE ASPIRIN PO Take by mouth      Cholecalciferol (VITAMIN D) 2000 units CAPS capsule Take by mouth      amLODIPine (NORVASC) 5 MG tablet Take 1 tablet by mouth       lisinopril-hydrochlorothiazide (ZESTORETIC) 20-12.5 MG per tablet Take 1 tablet by mouth      sertraline (ZOLOFT) 25 MG tablet Take 1 tablet by mouth      Omeprazole Magnesium (PRILOSEC) 2.5 MG PACK Take 5 mg by mouth      Melatonin 5 MG CAPS Take 1 capsule by mouth      medical marijuana Take by mouth as needed.  furosemide (LASIX) 10 MG/ML solution Take by mouth daily        No current facility-administered medications for this visit.      Allergies   Allergen Reactions    Codeine Anaphylaxis    Diphenhydramine     Amitriptyline Hcl     Atorvastatin Calcium     Bee Venom      Other reaction(s): (unknown)    Casein Hives    Ceftriaxone     Celecoxib Hives    Chlorpheniramine Maleate Other (See Comments)    Cocoa     Magnesium Hydroxide     Morphine Other (See Comments)    Nebivolol     Petrolatum     Strawberry Extract     Sulfamethoxazole Itching    Trimethoprim     Clavulanic Acid Diarrhea, Nausea And Vomiting and Nausea Only    Penicillins Diarrhea and Nausea And Vomiting     Patient Active Problem List   Diagnosis    Chronic obstructive lung disease ([facility] [address] [facility]    Coronary arteriosclerosis    Asthma    Diabetes mellitus without complication ([facility]    Essential hypertension    Appendicitis    Acute hypoxemic respiratory failure (HCC)    Acute cystitis without hematuria    Hyperglycemia    Initial Medicare annual wellness visit    Pulmonary emphysema [facility])     Past Medical History:   Diagnosis Date    Depression     GERD (gastroesophageal reflux disease)     Hypertension     Osteoarthritis      Past Surgical History:   Procedure Laterality Date    CHOLECYSTECTOMY      HYSTERECTOMY, VAGINAL      MASTECTOMY, BILATERAL Bilateral     UPPER GASTROINTESTINAL ENDOSCOPY       Family History   Problem Relation Age of Onset    High Blood Pressure Mother     High Cholesterol Mother     Arthritis Mother     Diabetes Father     Heart Disease Father      Social History     Tobacco Use    Smoking status: Current Every Day Smoker     Packs/day: 2.00     Years: 20.00     Pack years: 40.00     Types: Cigarettes     Start date: 12/1/2010    Smokeless tobacco: Never Used    Tobacco comment: Provided [id number]   Substance Use Topics    Alcohol use: Not Currently     Alcohol/week: 2.0 standard drinks     Types: 2 Shots of liquor per week         Review of Systems      OBJECTIVE:  There were no vitals taken for this visit. Physical Exam     Medical problems and test results were reviewed with the patient today.      Recent Results (from the past 672 hour(s))   Spirometry Without Bronchodilator    Collection Time: 05/15/22 12:00 AM   Result Value Ref Range    FVC 1,323 L    FVC Pred 3,123 L    FVC %Pred-Pre 322 %    FEV1 23 L    FEV1 Pred 321 L    FEV1 %Pred-Pre 32 %    FEV1/ %    FEV1/FVC Pred      FEV1/FVC %Pred-Pre      FEF 25-75%-Pre      FEF 25-75% Pred      FEF 25-75% %Pred-Pre      Expiratory Time      PEF-Pre      PEF Pred      PEF %Pred-Pre     Spirometry Without Bronchodilator    Collection Time: 05/16/22 10:38 AM   Result Value Ref Range    FVC 75 L    FVC Pred 65 L    FVC %Pred-Pre 564 %    FEV1 5,465 L    FEV1 Pred 5,465 L    FEV1 %Pred-Pre 7.84 %    FEV1/FVC 85 %    FEV1/FVC Pred 213 %    FEV1/FVC %Pred-Pre 2,161 %    FEF 25-75%-Pre 651 L/sec    FEF 25-75% Pred      FEF 25-75% %Pred-Pre Expiratory Time      PEF-Pre      PEF Pred      PEF %Pred-Pre     Spirometry Without Bronchodilator    Collection Time: 05/17/22 11:11 AM   Result Value Ref Range    FVC 4,561 L    FVC Pred 151 L    FVC %Pred-Pre 212 %    FEV1 213 L    FEV1 Pred 213 L    FEV1 %Pred-Pre 2,123 %    FEV1/FVC 2,121 %    FEV1/FVC Pred      FEV1/FVC %Pred-Pre      FEF 25-75%-Pre      FEF 25-75% Pred      FEF 25-75% %Pred-Pre      Expiratory Time      PEF-Pre      PEF Pred      PEF %Pred-Pre     CBC    Collection Time: 05/18/22 12:00 AM   Result Value Ref Range    WBC 12 10^3/mL    Hemoglobin 12 12.0 - 16.0 g/dL    Hematocrit 35 (A) 36 - 46 %    Platelets 102 K/µL    Neutrophils % 5 %    Lymphocytes % 5 %    Monocytes % 5 %    Eosinophils % 5 %    Basophils % 5 %    Neutrophils Absolute 5 /µL    Lymphocytes Absolute 5 /µL    Monocytes Absolute 5 /µL    Eosinophils Absolute 5 /µL    Basophils Absolute 5 /µL    RBC 5 10^6/µL    MCV 5 fL    MCH 5 pg    MCHC 5 g/dL    MPV 5 fL   CBC with Auto Differential    Collection Time: 05/22/22  9:25 AM   Result Value Ref Range    WBC 5.9 4.3 - 11.1 K/uL    RBC 4.22 4.05 - 5.2 M/uL    Hemoglobin 14.6 11.7 - 15.4 g/dL    Hematocrit 43.3 35.8 - 46.3 %    .6 (H) 79.6 - 97.8 FL    MCH 34.6 (H) 26.1 - 32.9 PG    MCHC 33.7 31.4 - 35.0 g/dL    RDW 17.2 (H) 11.9 - 14.6 %    Platelets 114 208 - 148 K/uL    MPV 11.0 9.4 - 12.3 FL    nRBC 0.00 0.0 - 0.2 K/uL    Differential Type AUTOMATED      Seg Neutrophils 48 43 - 78 %    Lymphocytes 29 13 - 44 %    Monocytes 18 (H) 4.0 - 12.0 %    Eosinophils % 2 0.5 - 7.8 %    Basophils 1 0.0 - 2.0 %    Immature Granulocytes 1 0.0 - 5.0 %    Segs Absolute 2.8 1.7 - 8.2 K/UL    Absolute Lymph # 1.7 0.5 - 4.6 K/UL    Absolute Mono # 1.1 0.1 - 1.3 K/UL    Absolute Eos # 0.1 0.0 - 0.8 K/UL    Basophils Absolute 0.1 0.0 - 0.2 K/UL    Absolute Immature Granulocyte 0.1 0.0 - 0.5 K/UL   Comprehensive Metabolic Panel    Collection Time: 05/22/22  9:25 AM   Result Value Ref Range    Sodium 140 136 - 145 mmol/L    Potassium 3.8 3.5 - 5.1 mmol/L    Chloride 107 98 - 107 mmol/L    CO2 25 21 - 32 mmol/L    Anion Gap 8 7 - 16 mmol/L    Glucose 96 65 - 100 mg/dL    BUN 32 (H) 8 - 23 MG/DL    CREATININE 2.10 (H) 0.6 - 1.0 MG/DL    GFR African American 30 (L) >60 ml/min/1.73m2    GFR Non- 25 (L) >60 ml/min/1.73m2    Calcium 10.3 8.3 - 10.4 MG/DL    Total Bilirubin 0.5 0.2 - 1.1 MG/DL    ALT 18 12 - 65 U/L    AST 17 15 - 37 U/L    Alk Phosphatase 65 50 - 136 U/L    Total Protein 7.2 6.3 - 8.2 g/dL    Albumin 3.8 3.2 - 4.6 g/dL    Globulin 3.4 2.3 - 3.5 g/dL    Albumin/Globulin Ratio 1.1 (L) 1.2 - 3.5     CBC with Auto Differential    Collection Time: 05/22/22  3:20 PM   Result Value Ref Range    WBC 6.3 4.3 - 11.1 K/uL    RBC 4.23 M/uL    Hemoglobin 13.5 11.7 - 15.4 g/dL    Hematocrit 42.6 35.8 - 46.3 %    MCV 98.0 (H) 79.6 - 97.8 FL    MCH 36.2 (H) 26.1 - 32.9 PG    MCHC 35.8 g/dL    RDW 12.0 %    Platelets 592 K/uL    MPV 13.0 (H) 9.4 - 12.3 FL    nRBC 0.00 K/uL   Spirometry Without Bronchodilator    Collection Time: 05/23/22 12:00 AM   Result Value Ref Range    FVC 3.01 L    FVC Pred 114 L    FVC %Pred-Pre 2.27 %    FEV1 120 L    FEV1 Pred 75 L    FEV1 %Pred-Pre 75 %    FEV1/FVC 2.27 %    FEV1/FVC Pred      FEV1/FVC %Pred-Pre      FEF 25-75%-Pre      FEF 25-75% Pred      FEF 25-75% %Pred-Pre      Expiratory Time      PEF-Pre      PEF Pred      PEF %Pred-Pre           ASSESSMENT and PLAN    There are no diagnoses linked to this encounter. No follow-ups on file.    Medicare Annual Wellness Visit    Adult Rupali Ho is here for Medicare AWV    Assessment & Plan   Initial Medicare annual wellness visit  Hyperglycemia  Diabetes mellitus without complication ([facility] [address]  Primary hypertension      Recommendations for Preventive Services Due: see orders and patient instructions/AVS.  Recommended screening schedule for the next 5-10 years is provided to the patient in written form: see Patient Instructions/AVS.     Return for Medicare Annual Wellness Visit in 1 year. Subjective   The following acute and/or chronic problems were also addressed today:  hypertension    Patient's complete Health Risk Assessment and screening values have been reviewed and are found in Flowsheets. The following problems were reviewed today and where indicated follow up appointments were made and/or referrals ordered.     Positive Risk Factor Screenings with Interventions:    Fall Risk:        Fall Risk Interventions:    · Home safety tips provided      Tobacco Use:     Tobacco Use: High Risk    Smoking Tobacco Use: Current Every Day Smoker    Smokeless Tobacco Use: Never Used     E-Cigarettes/Vaping Use     Questions Responses    E-Cigarette/Vaping Use Never User    Start Date     Passive Exposure     Quit Date     Counseling Given     Comments Unknown        Substance Use - Tobacco Interventions:  tobacco cessation tips and resources provided         General Health and ACP:       Advance Directives     Power of  Living Will ACP-Advance Directive ACP-Power of     Not on File Not on File Not on File Not on File      General Health Risk Interventions:  · Poor self-assessment of health status: patient advised to follow-up in this office for further evaluation and treatment of 6 within 8 day(s)    Health Habits/Nutrition:     Physical Activity:     Days of Exercise per Week: Not on file    Minutes of Exercise per Session: Not on file                Health Habits/Nutrition Interventions:  · Inadequate physical activity:  patient agrees to exercise for at least 150 minutes/week             Objective      Patient-Reported Vitals  No data recorded            Allergies   Allergen Reactions    Codeine Anaphylaxis    Diphenhydramine     Amitriptyline Hcl     Atorvastatin Calcium     Bee Venom      Other reaction(s): (unknown)    Casein Hives    Ceftriaxone     Celecoxib Hives    Chlorpheniramine Maleate Other (See Comments)    Cocoa     Magnesium Hydroxide     Morphine Other (See Comments)    Nebivolol     Petrolatum     Strawberry Extract     Sulfamethoxazole Itching    Trimethoprim     Clavulanic Acid Diarrhea, Nausea And Vomiting and Nausea Only    Penicillins Diarrhea and Nausea And Vomiting     Prior to Visit Medications    Medication Sig Taking? Authorizing Provider   meloxicam (MOBIC) 7.5 MG tablet Take 1 tablet by mouth daily  Linda Santos MD   acetaminophen (TYLENOL) 325 MG tablet Take 325 mg by mouth every 4 hours as needed for Pain  Historical Provider, MD   LOW-DOSE ASPIRIN PO Take by mouth  Historical Provider, MD   Cholecalciferol (VITAMIN D) 2000 units CAPS capsule Take by mouth  Historical Provider, MD   amLODIPine (NORVASC) 5 MG tablet Take 1 tablet by mouth   Historical Provider, MD   lisinopril-hydrochlorothiazide (ZESTORETIC) 20-12.5 MG per tablet Take 1 tablet by mouth  Historical Provider, MD   sertraline (ZOLOFT) 25 MG tablet Take 1 tablet by mouth  Historical Provider, MD   Omeprazole Magnesium (PRILOSEC) 2.5 MG PACK Take 5 mg by mouth  Historical Provider, MD   Melatonin 5 MG CAPS Take 1 capsule by mouth  Historical Provider, MD   medical marijuana Take by mouth as needed. Historical Provider, MD   furosemide (LASIX) 10 MG/ML solution Take by mouth daily   Historical Provider, MD Mcghee (Including outside providers/suppliers regularly involved in providing care):   Patient Care Team:  None Provider as PCP - General  Provider Test, MD  Provider MD Regla as Primary Care Provider  Boyd Fowler RN as [facility]     Reviewed and updated this visit:            Adult Gia, was evaluated through a synchronous (real-time) audio-video encounter. The patient (or guardian if applicable) is aware that this is a billable service, which includes applicable co-pays. This Virtual Visit was conducted with patient's (and/or legal guardian's) consent.  The visit was conducted pursuant to the emergency declaration under the [facility], [address] [place] [address] authority and the [employer] and [employer] General Act. Patient identification was verified, and a caregiver was present when appropriate. The patient was located at home in a state where the provider was licensed to provide care.

## 2022-05-24 NOTE — PROGRESS NOTES
On the basis of positive falls risk screening, assessment and plan is as follows: {desc; [facility] falls risk plan:[id number]}.

## 2022-05-24 NOTE — PROGRESS NOTES
HPI    Past Medical History, Past Surgical History, Family history, Social History, and Medications were all reviewed with the patient today and updated as necessary. Current Outpatient Medications   Medication Sig Dispense Refill    meloxicam (MOBIC) 7.5 MG tablet Take 1 tablet by mouth daily 30 tablet 5    acetaminophen (TYLENOL) 325 MG tablet Take 325 mg by mouth every 4 hours as needed for Pain      LOW-DOSE ASPIRIN PO Take by mouth      Cholecalciferol (VITAMIN D) 2000 units CAPS capsule Take by mouth      amLODIPine (NORVASC) 5 MG tablet Take 1 tablet by mouth       lisinopril-hydrochlorothiazide (ZESTORETIC) 20-12.5 MG per tablet Take 1 tablet by mouth      sertraline (ZOLOFT) 25 MG tablet Take 1 tablet by mouth      Omeprazole Magnesium (PRILOSEC) 2.5 MG PACK Take 5 mg by mouth      Melatonin 5 MG CAPS Take 1 capsule by mouth      medical marijuana Take by mouth as needed.  furosemide (LASIX) 10 MG/ML solution Take by mouth daily        No current facility-administered medications for this visit.      Allergies   Allergen Reactions    Codeine Anaphylaxis    Diphenhydramine     Amitriptyline Hcl     Atorvastatin Calcium     Bee Venom      Other reaction(s): (unknown)    Casein Hives    Ceftriaxone     Celecoxib Hives    Chlorpheniramine Maleate Other (See Comments)    Cocoa     Magnesium Hydroxide     Morphine Other (See Comments)    Nebivolol     Petrolatum     Strawberry Extract     Sulfamethoxazole Itching    Trimethoprim     Clavulanic Acid Diarrhea, Nausea And Vomiting and Nausea Only    Penicillins Diarrhea and Nausea And Vomiting     Patient Active Problem List   Diagnosis    Chronic obstructive lung disease [facility] [address]    Coronary arteriosclerosis    Asthma    Diabetes mellitus without complication ([facility] [address]    Essential hypertension    Appendicitis    Acute hypoxemic respiratory failure (HCC)    Acute cystitis without hematuria    Hyperglycemia    Initial Medicare annual wellness visit    Pulmonary emphysema [facility])     Past Medical History:   Diagnosis Date    Depression     GERD (gastroesophageal reflux disease)     Hypertension     Osteoarthritis      Past Surgical History:   Procedure Laterality Date    CHOLECYSTECTOMY      HYSTERECTOMY, VAGINAL      MASTECTOMY, BILATERAL Bilateral     UPPER GASTROINTESTINAL ENDOSCOPY       Family History   Problem Relation Age of Onset    High Blood Pressure Mother     High Cholesterol Mother     Arthritis Mother     Diabetes Father     Heart Disease Father      Social History     Tobacco Use    Smoking status: Current Every Day Smoker     Packs/day: 2.00     Years: 20.00     Pack years: 40.00     Types: Cigarettes     Start date: 12/1/2010    Smokeless tobacco: Never Used    Tobacco comment: Provided [id number]   Substance Use Topics    Alcohol use: Not Currently     Alcohol/week: 2.0 standard drinks     Types: 2 Shots of liquor per week         Review of Systems      OBJECTIVE:  There were no vitals taken for this visit. Physical Exam    Medical problems and test results were reviewed with the patient today.      Recent Results (from the past 672 hour(s))   Spirometry Without Bronchodilator    Collection Time: 05/15/22 12:00 AM   Result Value Ref Range    FVC 1,323 L    FVC Pred 3,123 L    FVC %Pred-Pre 322 %    FEV1 23 L    FEV1 Pred 321 L    FEV1 %Pred-Pre 32 %    FEV1/ %    FEV1/FVC Pred      FEV1/FVC %Pred-Pre      FEF 25-75%-Pre      FEF 25-75% Pred      FEF 25-75% %Pred-Pre      Expiratory Time      PEF-Pre      PEF Pred      PEF %Pred-Pre     Spirometry Without Bronchodilator    Collection Time: 05/16/22 10:38 AM   Result Value Ref Range    FVC 75 L    FVC Pred 65 L    FVC %Pred-Pre 564 %    FEV1 5,465 L    FEV1 Pred 5,465 L    FEV1 %Pred-Pre 7.84 %    FEV1/FVC 85 %    FEV1/FVC Pred 213 %    FEV1/FVC %Pred-Pre 2,161 %    FEF 25-75%-Pre 651 L/sec    FEF 25-75% Pred      FEF 25-75% %Pred-Pre Expiratory Time      PEF-Pre      PEF Pred      PEF %Pred-Pre     Spirometry Without Bronchodilator    Collection Time: 05/17/22 11:11 AM   Result Value Ref Range    FVC 4,561 L    FVC Pred 151 L    FVC %Pred-Pre 212 %    FEV1 213 L    FEV1 Pred 213 L    FEV1 %Pred-Pre 2,123 %    FEV1/FVC 2,121 %    FEV1/FVC Pred      FEV1/FVC %Pred-Pre      FEF 25-75%-Pre      FEF 25-75% Pred      FEF 25-75% %Pred-Pre      Expiratory Time      PEF-Pre      PEF Pred      PEF %Pred-Pre     CBC    Collection Time: 05/18/22 12:00 AM   Result Value Ref Range    WBC 12 10^3/mL    Hemoglobin 12 12.0 - 16.0 g/dL    Hematocrit 35 (A) 36 - 46 %    Platelets 321 K/µL    Neutrophils % 5 %    Lymphocytes % 5 %    Monocytes % 5 %    Eosinophils % 5 %    Basophils % 5 %    Neutrophils Absolute 5 /µL    Lymphocytes Absolute 5 /µL    Monocytes Absolute 5 /µL    Eosinophils Absolute 5 /µL    Basophils Absolute 5 /µL    RBC 5 10^6/µL    MCV 5 fL    MCH 5 pg    MCHC 5 g/dL    MPV 5 fL   CBC with Auto Differential    Collection Time: 05/22/22  9:25 AM   Result Value Ref Range    WBC 5.9 4.3 - 11.1 K/uL    RBC 4.22 4.05 - 5.2 M/uL    Hemoglobin 14.6 11.7 - 15.4 g/dL    Hematocrit 43.3 35.8 - 46.3 %    .6 (H) 79.6 - 97.8 FL    MCH 34.6 (H) 26.1 - 32.9 PG    MCHC 33.7 31.4 - 35.0 g/dL    RDW 17.2 (H) 11.9 - 14.6 %    Platelets 864 167 - 066 K/uL    MPV 11.0 9.4 - 12.3 FL    nRBC 0.00 0.0 - 0.2 K/uL    Differential Type AUTOMATED      Seg Neutrophils 48 43 - 78 %    Lymphocytes 29 13 - 44 %    Monocytes 18 (H) 4.0 - 12.0 %    Eosinophils % 2 0.5 - 7.8 %    Basophils 1 0.0 - 2.0 %    Immature Granulocytes 1 0.0 - 5.0 %    Segs Absolute 2.8 1.7 - 8.2 K/UL    Absolute Lymph # 1.7 0.5 - 4.6 K/UL    Absolute Mono # 1.1 0.1 - 1.3 K/UL    Absolute Eos # 0.1 0.0 - 0.8 K/UL    Basophils Absolute 0.1 0.0 - 0.2 K/UL    Absolute Immature Granulocyte 0.1 0.0 - 0.5 K/UL   Comprehensive Metabolic Panel    Collection Time: 05/22/22  9:25 AM   Result Value Ref Range    Sodium 140 136 - 145 mmol/L    Potassium 3.8 3.5 - 5.1 mmol/L    Chloride 107 98 - 107 mmol/L    CO2 25 21 - 32 mmol/L    Anion Gap 8 7 - 16 mmol/L    Glucose 96 65 - 100 mg/dL    BUN 32 (H) 8 - 23 MG/DL    CREATININE 2.10 (H) 0.6 - 1.0 MG/DL    GFR African American 30 (L) >60 ml/min/1.73m2    GFR Non- 25 (L) >60 ml/min/1.73m2    Calcium 10.3 8.3 - 10.4 MG/DL    Total Bilirubin 0.5 0.2 - 1.1 MG/DL    ALT 18 12 - 65 U/L    AST 17 15 - 37 U/L    Alk Phosphatase 65 50 - 136 U/L    Total Protein 7.2 6.3 - 8.2 g/dL    Albumin 3.8 3.2 - 4.6 g/dL    Globulin 3.4 2.3 - 3.5 g/dL    Albumin/Globulin Ratio 1.1 (L) 1.2 - 3.5     CBC with Auto Differential    Collection Time: 05/22/22  3:20 PM   Result Value Ref Range    WBC 6.3 4.3 - 11.1 K/uL    RBC 4.23 M/uL    Hemoglobin 13.5 11.7 - 15.4 g/dL    Hematocrit 42.6 35.8 - 46.3 %    MCV 98.0 (H) 79.6 - 97.8 FL    MCH 36.2 (H) 26.1 - 32.9 PG    MCHC 35.8 g/dL    RDW 12.0 %    Platelets 857 K/uL    MPV 13.0 (H) 9.4 - 12.3 FL    nRBC 0.00 K/uL   Spirometry Without Bronchodilator    Collection Time: 05/23/22 12:00 AM   Result Value Ref Range    FVC 3.01 L    FVC Pred 114 L    FVC %Pred-Pre 2.27 %    FEV1 120 L    FEV1 Pred 75 L    FEV1 %Pred-Pre 75 %    FEV1/FVC 2.27 %    FEV1/FVC Pred      FEV1/FVC %Pred-Pre      FEF 25-75%-Pre      FEF 25-75% Pred      FEF 25-75% %Pred-Pre      Expiratory Time      PEF-Pre      PEF Pred      PEF %Pred-Pre           ASSESSMENT and PLAN    Diagnoses and all orders for this visit:    Coronary arteriosclerosis    Pulmonary emphysema, unspecified emphysema type (HCC)    Diabetes mellitus without complication ([facility] [address]        Return in about 3 months (around 8/23/2022).

## 2022-05-24 NOTE — PROGRESS NOTES
Adult Gia (:  1947) is a 70 y.o. female,Established patient, here for evaluation of the following chief complaint(s):  Hypertension         ASSESSMENT/PLAN:  1. Pulmonary emphysema, unspecified emphysema type ([facility] [address]  2. Essential hypertension  3. Diabetes mellitus without complication ([facility] [address]      Return in about 3 months (around 2022). Subjective   SUBJECTIVE/OBJECTIVE:  HPI    Review of Systems   Constitutional: Negative for activity change. Respiratory: Negative for shortness of breath. Cardiovascular: Negative for palpitations and leg swelling. Objective   Physical Exam  Constitutional:       Appearance: Normal appearance. Neurological:      Mental Status: She is alert. On this date 2022 I have spent 20 minutes reviewing previous notes, test results and face to face with the patient discussing the diagnosis and importance of compliance with the treatment plan as well as documenting on the day of the visit.       An electronic signature was used to authenticate this note.    --Brooklyn Iqbal MD

## 2022-05-25 ENCOUNTER — HOSPITAL ENCOUNTER (OUTPATIENT)
Dept: LAB | Age: 77
Setting detail: SPECIMEN
Discharge: HOME | End: 2022-05-28

## 2022-05-25 DIAGNOSIS — R53.83 FATIGUE DUE TO TREATMENT: Primary | ICD-10-CM

## 2022-05-25 DIAGNOSIS — R53.83 FATIGUE DUE TO TREATMENT: ICD-10-CM

## 2022-05-25 DIAGNOSIS — Z87.891 HISTORY OF CIGARETTE SMOKING: Primary | ICD-10-CM

## 2022-05-25 DIAGNOSIS — Z87.891 HISTORY OF CIGARETTE SMOKING: ICD-10-CM

## 2022-05-25 DIAGNOSIS — R06.02 SOB (SHORTNESS OF BREATH): Primary | ICD-10-CM

## 2022-05-25 LAB
O2 AMOUNT: 2
SPO2: 95 %
TSH, 3RD GENERATION: 0.95 UIU/ML (ref 0.36–3.74)

## 2022-05-26 ENCOUNTER — CARE COORDINATION (OUTPATIENT)
Dept: CASE MANAGEMENT | Age: 77
End: 2022-05-26

## 2022-05-26 DIAGNOSIS — R06.02 SOB (SHORTNESS OF BREATH): Primary | ICD-10-CM

## 2022-05-27 ENCOUNTER — CARE COORDINATION (OUTPATIENT)
Dept: CARE COORDINATION | Age: 77
End: 2022-05-27

## 2022-05-27 ENCOUNTER — PROCEDURE VISIT (OUTPATIENT)
Dept: INTERNAL MEDICINE CLINIC | Facility: CLINIC | Age: 77
End: 2022-05-27

## 2022-05-27 DIAGNOSIS — R73.9 HYPERGLYCEMIA: Primary | ICD-10-CM

## 2022-05-27 DIAGNOSIS — J43.9 PULMONARY EMPHYSEMA, UNSPECIFIED EMPHYSEMA TYPE (HCC): Primary | ICD-10-CM

## 2022-05-27 DIAGNOSIS — I25.10 CORONARY ARTERIOSCLEROSIS: ICD-10-CM

## 2022-05-27 ASSESSMENT — SOCIAL DETERMINANTS OF HEALTH (SDOH): HOW HARD IS IT FOR YOU TO PAY FOR THE VERY BASICS LIKE FOOD, HOUSING, MEDICAL CARE, AND HEATING?: VERY HARD

## 2022-05-27 NOTE — ACP (ADVANCE CARE PLANNING)
Advance Care Planning   Healthcare Decision Maker:    Primary Decision Maker: xxx, xxx - Cayden Bello  [phone]    Secondary Decision Maker: navin, karen - Child - [phone]    Click here to complete Healthcare Decision Makers including selection of the Healthcare Decision Maker Relationship (ie \"Primary\").

## 2022-05-30 NOTE — PROGRESS NOTES
HPI    Past Medical History, Past Surgical History, Family history, Social History, and Medications were all reviewed with the patient today and updated as necessary. Current Outpatient Medications   Medication Sig Dispense Refill    meloxicam (MOBIC) 7.5 MG tablet Take 1 tablet by mouth daily 30 tablet 5    acetaminophen (TYLENOL) 325 MG tablet Take 325 mg by mouth every 4 hours as needed for Pain      LOW-DOSE ASPIRIN PO Take by mouth      Cholecalciferol (VITAMIN D) 2000 units CAPS capsule Take by mouth      amLODIPine (NORVASC) 5 MG tablet Take 1 tablet by mouth       lisinopril-hydrochlorothiazide (ZESTORETIC) 20-12.5 MG per tablet Take 1 tablet by mouth      sertraline (ZOLOFT) 25 MG tablet Take 1 tablet by mouth      Omeprazole Magnesium (PRILOSEC) 2.5 MG PACK Take 5 mg by mouth      Melatonin 5 MG CAPS Take 1 capsule by mouth      medical marijuana Take by mouth as needed.  furosemide (LASIX) 10 MG/ML solution Take by mouth daily        No current facility-administered medications for this visit.      Allergies   Allergen Reactions    Codeine Anaphylaxis    Diphenhydramine     Amitriptyline Hcl     Atorvastatin Calcium     Bee Venom      Other reaction(s): (unknown)    Casein Hives    Ceftriaxone     Celecoxib Hives    Chlorpheniramine Maleate Other (See Comments)    Cocoa     Magnesium Hydroxide     Morphine Other (See Comments)    Nebivolol     Petrolatum     Strawberry Extract     Sulfamethoxazole Itching    Trimethoprim     Clavulanic Acid Diarrhea, Nausea And Vomiting and Nausea Only    Penicillins Diarrhea and Nausea And Vomiting     Patient Active Problem List   Diagnosis    Chronic obstructive lung disease ([facility] [address]    Coronary arteriosclerosis    Asthma    Diabetes mellitus without complication ([facility] [address]    Essential hypertension    Appendicitis    Acute hypoxemic respiratory failure (HCC)    Acute cystitis without hematuria    Hyperglycemia    Initial Medicare annual wellness visit    Pulmonary emphysema ([facility] [address]         Review of Systems      OBJECTIVE:  There were no vitals taken for this visit. Physical Exam     Medical problems and test results were reviewed with the patient today.      Recent Results (from the past 672 hour(s))   Spirometry Without Bronchodilator    Collection Time: 05/15/22 12:00 AM   Result Value Ref Range    FVC 1,323 L    FVC Pred 3,123 L    FVC %Pred-Pre 322 %    FEV1 23 L    FEV1 Pred 321 L    FEV1 %Pred-Pre 32 %    FEV1/ %    FEV1/FVC Pred      FEV1/FVC %Pred-Pre      FEF 25-75%-Pre      FEF 25-75% Pred      FEF 25-75% %Pred-Pre      Expiratory Time      PEF-Pre      PEF Pred      PEF %Pred-Pre     Spirometry Without Bronchodilator    Collection Time: 05/16/22 10:38 AM   Result Value Ref Range    FVC 75 L    FVC Pred 65 L    FVC %Pred-Pre 564 %    FEV1 5,465 L    FEV1 Pred 5,465 L    FEV1 %Pred-Pre 7.84 %    FEV1/FVC 85 %    FEV1/FVC Pred 213 %    FEV1/FVC %Pred-Pre 2,161 %    FEF 25-75%-Pre 651 L/sec    FEF 25-75% Pred      FEF 25-75% %Pred-Pre      Expiratory Time      PEF-Pre      PEF Pred      PEF %Pred-Pre     Spirometry Without Bronchodilator    Collection Time: 05/17/22 11:11 AM   Result Value Ref Range    FVC 4,561 L    FVC Pred 151 L    FVC %Pred-Pre 212 %    FEV1 213 L    FEV1 Pred 213 L    FEV1 %Pred-Pre 2,123 %    FEV1/FVC 2,121 %    FEV1/FVC Pred      FEV1/FVC %Pred-Pre      FEF 25-75%-Pre      FEF 25-75% Pred      FEF 25-75% %Pred-Pre      Expiratory Time      PEF-Pre      PEF Pred      PEF %Pred-Pre     CBC    Collection Time: 05/18/22 12:00 AM   Result Value Ref Range    WBC 12 10^3/mL    Hemoglobin 12 12.0 - 16.0 g/dL    Hematocrit 35 (A) 36 - 46 %    Platelets 045 K/µL    Neutrophils % 5 %    Lymphocytes % 5 %    Monocytes % 5 %    Eosinophils % 5 %    Basophils % 5 %    Neutrophils Absolute 5 /µL    Lymphocytes Absolute 5 /µL    Monocytes Absolute 5 /µL    Eosinophils Absolute 5 /µL    Basophils Absolute 5 /µL    RBC 5 10^6/µL    MCV 5 fL    MCH 5 pg    MCHC 5 g/dL    MPV 5 fL   CBC with Auto Differential    Collection Time: 05/22/22  9:25 AM   Result Value Ref Range    WBC 5.9 4.3 - 11.1 K/uL    RBC 4.22 4.05 - 5.2 M/uL    Hemoglobin 14.6 11.7 - 15.4 g/dL    Hematocrit 43.3 35.8 - 46.3 %    .6 (H) 79.6 - 97.8 FL    MCH 34.6 (H) 26.1 - 32.9 PG    MCHC 33.7 31.4 - 35.0 g/dL    RDW 17.2 (H) 11.9 - 14.6 %    Platelets 038 398 - 478 K/uL    MPV 11.0 9.4 - 12.3 FL    nRBC 0.00 0.0 - 0.2 K/uL    Differential Type AUTOMATED      Seg Neutrophils 48 43 - 78 %    Lymphocytes 29 13 - 44 %    Monocytes 18 (H) 4.0 - 12.0 %    Eosinophils % 2 0.5 - 7.8 %    Basophils 1 0.0 - 2.0 %    Immature Granulocytes 1 0.0 - 5.0 %    Segs Absolute 2.8 1.7 - 8.2 K/UL    Absolute Lymph # 1.7 0.5 - 4.6 K/UL    Absolute Mono # 1.1 0.1 - 1.3 K/UL    Absolute Eos # 0.1 0.0 - 0.8 K/UL    Basophils Absolute 0.1 0.0 - 0.2 K/UL    Absolute Immature Granulocyte 0.1 0.0 - 0.5 K/UL   Comprehensive Metabolic Panel    Collection Time: 05/22/22  9:25 AM   Result Value Ref Range    Sodium 140 136 - 145 mmol/L    Potassium 3.8 3.5 - 5.1 mmol/L    Chloride 107 98 - 107 mmol/L    CO2 25 21 - 32 mmol/L    Anion Gap 8 7 - 16 mmol/L    Glucose 96 65 - 100 mg/dL    BUN 32 (H) 8 - 23 MG/DL    CREATININE 2.10 (H) 0.6 - 1.0 MG/DL    GFR African American 30 (L) >60 ml/min/1.73m2    GFR Non- 25 (L) >60 ml/min/1.73m2    Calcium 10.3 8.3 - 10.4 MG/DL    Total Bilirubin 0.5 0.2 - 1.1 MG/DL    ALT 18 12 - 65 U/L    AST 17 15 - 37 U/L    Alk Phosphatase 65 50 - 136 U/L    Total Protein 7.2 6.3 - 8.2 g/dL    Albumin 3.8 3.2 - 4.6 g/dL    Globulin 3.4 2.3 - 3.5 g/dL    Albumin/Globulin Ratio 1.1 (L) 1.2 - 3.5     CBC with Auto Differential    Collection Time: 05/22/22  3:20 PM   Result Value Ref Range    WBC 6.3 4.3 - 11.1 K/uL    RBC 4.23 M/uL    Hemoglobin 13.5 11.7 - 15.4 g/dL    Hematocrit 42.6 35.8 - 46.3 %    MCV 98.0 (H) 79.6 - 97.8 FL    MCH 36.2 (H) 26.1 - 32.9 PG    MCHC 35.8 g/dL    RDW 12.0 %    Platelets 398 K/uL    MPV 13.0 (H) 9.4 - 12.3 FL    nRBC 0.00 K/uL   Spirometry Without Bronchodilator    Collection Time: 05/23/22 12:00 AM   Result Value Ref Range    FVC 3.01 L    FVC Pred 114 L    FVC %Pred-Pre 2.27 %    FEV1 120 L    FEV1 Pred 75 L    FEV1 %Pred-Pre 75 %    FEV1/FVC 2.27 %    FEV1/FVC Pred      FEV1/FVC %Pred-Pre      FEF 25-75%-Pre      FEF 25-75% Pred      FEF 25-75% %Pred-Pre      Expiratory Time      PEF-Pre      PEF Pred      PEF %Pred-Pre     AMB POC PULSE OXIMETRY, MULTIPLE    Collection Time: 05/25/22 12:00 AM   Result Value Ref Range    SpO2 95 %    O2 amount? 2    TSH    Collection Time: 05/25/22  2:20 PM   Result Value Ref Range    TSH, 3RD GENERATION 0.951 0.358 - 3.740 uIU/mL         ASSESSMENT and PLAN    Diagnoses and all orders for this visit:    Pulmonary emphysema, unspecified emphysema type ([facility] [address]        No follow-ups on file.

## 2022-05-31 ENCOUNTER — CLINICAL DOCUMENTATION (OUTPATIENT)
Dept: ONCOLOGY | Age: 77
End: 2022-05-31

## 2022-06-02 ENCOUNTER — CARE COORDINATION (OUTPATIENT)
Dept: CARE COORDINATION | Facility: CLINIC | Age: 77
End: 2022-06-02

## 2022-06-07 ENCOUNTER — CLINICAL DOCUMENTATION (OUTPATIENT)
Dept: CARDIOLOGY CLINIC | Age: 77
End: 2022-06-07

## 2022-06-08 NOTE — PATIENT INSTRUCTIONS
Patient Education         Avoiding COPD Triggers (01:49)  Your health professional recommends that you watch this short online healthvideo. Learn how to avoid the things that make your COPD symptoms worse. Purpose:  Outlines steps to avoid common COPD triggers, including the flu, pneumonia, andair pollution. Goal:  The user will learn steps to avoid common COPD triggers, including the flu,pneumonia, and air pollution. How to watch the video    Scan the QR code   OR Visit the website    https://hwi. se/r/Thzr8wdktndog   Current as of: July 6, 2021               Content Version: 13.2  © 2006-2022 [employer]. Care instructions adapted under license by [facility] ([facility]). If you have questions about a medical condition or this instruction, always ask your healthcare professional. [address] any warranty or liability for your use of this information. The medication list included in this document is our record of what you are currently taking, including any changes that were made at today's visit.  If you find any differences when compared to your medications at home, or have any questions that were not answered at your visit, please contact the office.

## 2022-06-09 ENCOUNTER — HOSPITAL ENCOUNTER (OUTPATIENT)
Dept: LAB | Age: 77
Setting detail: SPECIMEN
Discharge: HOME | End: 2022-06-12

## 2022-06-09 DIAGNOSIS — R53.83 FATIGUE DUE TO TREATMENT: Primary | ICD-10-CM

## 2022-06-09 DIAGNOSIS — R53.83 FATIGUE DUE TO TREATMENT: ICD-10-CM

## 2022-06-13 ENCOUNTER — PROCEDURE VISIT (OUTPATIENT)
Dept: INTERNAL MEDICINE | Age: 77
End: 2022-06-13

## 2022-06-13 DIAGNOSIS — N30.00 ACUTE CYSTITIS WITHOUT HEMATURIA: Primary | ICD-10-CM

## 2022-06-20 ENCOUNTER — CARE COORDINATION (OUTPATIENT)
Dept: CARE COORDINATION | Age: 77
End: 2022-06-20

## 2022-06-20 NOTE — PROGRESS NOTES
CHIEF COMPLAINT:  No chief complaint on file. HISTORY OF PRESENT ILLNESS:  Ms. Klever Francis is a 70 y.o. female  who present      HISTORY:  Allergies   Allergen Reactions    Codeine Anaphylaxis    Diphenhydramine     Amitriptyline Hcl     Atorvastatin Calcium     Bee Venom      Other reaction(s): (unknown)    Casein Hives    Ceftriaxone     Celecoxib Hives    Chlorpheniramine Maleate Other (See Comments)    Cocoa     Magnesium Hydroxide     Morphine Other (See Comments)    Nebivolol     Petrolatum     Strawberry Extract     Sulfamethoxazole Itching    Trimethoprim     Clavulanic Acid Diarrhea, Nausea And Vomiting and Nausea Only    Penicillins Diarrhea and Nausea And Vomiting       Tobacco Use      Smoking status: Current Every Day Smoker        Packs/day: 2.00        Years: 20.00        Pack years: 40        Types: Cigarettes        Start date: 2010      Smokeless tobacco: Never Used      Tobacco comment: Provided [id number]     Social History     Substance and Sexual Activity   Alcohol Use Not Currently    Alcohol/week: 2.0 standard drinks    Types: 2 Shots of liquor per week      Family History   Problem Relation Age of Onset    High Blood Pressure Mother     High Cholesterol Mother     Arthritis Mother     Diabetes Father     Heart Disease Father       Past Surgical History:   Procedure Laterality Date    CHOLECYSTECTOMY      HYSTERECTOMY, VAGINAL      MASTECTOMY, BILATERAL Bilateral     UPPER GASTROINTESTINAL ENDOSCOPY        OB History    Para Term  AB Living   1             SAB IAB Ectopic Molar Multiple Live Births                    # Outcome Date GA Lbr Kyle/2nd Weight Sex Delivery Anes PTL Lv   1                      REVIEW OF SYSTEMS:  Review of systems is as indicated in HPI otherwise negative. PHYSICAL EXAM:  Vital Signs -   There were no vitals taken for this visit.    [unfilled]   Physical Exam             PHQ:  PHQ-9  2021   Little interest or pleasure in doing things 0   Feeling down, depressed, or hopeless 0   Trouble falling or staying asleep, or sleeping too much -   Feeling tired or having little energy -   Poor appetite or overeating -   Feeling bad about yourself - or that you are a failure or have let yourself or your family down -   Trouble concentrating on things, such as reading the newspaper or watching television -   Moving or speaking so slowly that other people could have noticed. Or the opposite - being so fidgety or restless that you have been moving around a lot more than usual -   Thoughts that you would be better off dead, or of hurting yourself in some way -   PHQ-2 Score 0   PHQ-9 Total Score 0   If you checked off any problems, how difficult have these problems made it for you to do your work, take care of things at home, or get along with other people? -       LABS  No results found for this visit on 05/27/22. Hospital Outpatient Visit on 05/25/2022   Component Date Value Ref Range Status    TSH, 3RD GENERATION 05/25/2022 0.951  0.358 - 3.740 uIU/mL Final       Assessment & Plan      IMPRESSION/PLAN     Diagnosis Orders   1. Pulmonary emphysema, unspecified emphysema type ([facility] [address]         Follow up and Dispositions:  No follow-ups on file. Patient is stable on medications and will continue current medications. Refilled the above medications. Will add the following medications: Will change the following medications:  Reviewed medications and side effects in detail. Was given samples of   Will check the above labs. Reviewed most recent labs. Reviewed diet, exercise and weight control. Cardiovascular risks and recommendations reviewed. Patient encouraged to quit smoking. Patient encouraged to take medications as prescribed. Patient encouraged to follow a diabetic/low sodium diet. Use of aspirin to prevent MIs and TIAs discussed. Patient will check blood sugars once daily.     DARBY Mueller, FNP-C

## 2022-06-20 NOTE — PROGRESS NOTES
Training - error        CHIEF COMPLAINT:  No chief complaint on file. HISTORY OF PRESENT ILLNESS:  Ms. Sagar Montero is a 70 y.o. female  who presents ***      HISTORY:  Allergies   Allergen Reactions    Codeine Anaphylaxis    Diphenhydramine     Amitriptyline Hcl     Atorvastatin Calcium     Bee Venom      Other reaction(s): (unknown)    Casein Hives    Ceftriaxone     Celecoxib Hives    Chlorpheniramine Maleate Other (See Comments)    Cocoa     Magnesium Hydroxide     Morphine Other (See Comments)    Nebivolol     Petrolatum     Strawberry Extract     Sulfamethoxazole Itching    Trimethoprim     Clavulanic Acid Diarrhea, Nausea And Vomiting and Nausea Only    Penicillins Diarrhea and Nausea And Vomiting       Tobacco Use      Smoking status: Current Every Day Smoker        Packs/day: 2.00        Years: 20.00        Pack years: 40        Types: Cigarettes        Start date: 2010      Smokeless tobacco: Never Used      Tobacco comment: Provided [id number]     Social History     Substance and Sexual Activity   Alcohol Use Not Currently    Alcohol/week: 2.0 standard drinks    Types: 2 Shots of liquor per week      Family History   Problem Relation Age of Onset    High Blood Pressure Mother     High Cholesterol Mother     Arthritis Mother     Diabetes Father     Heart Disease Father       Past Surgical History:   Procedure Laterality Date    CHOLECYSTECTOMY      HYSTERECTOMY, VAGINAL      MASTECTOMY, BILATERAL Bilateral     UPPER GASTROINTESTINAL ENDOSCOPY        OB History    Para Term  AB Living   1             SAB IAB Ectopic Molar Multiple Live Births                    # Outcome Date GA Lbr Kyle/2nd Weight Sex Delivery Anes PTL Lv   1                      REVIEW OF SYSTEMS:  Review of systems is as indicated in HPI otherwise negative. PHYSICAL EXAM:  Vital Signs -   There were no vitals taken for this visit.    [unfilled]   Physical Exam ***    PHQ:  PHQ-9  7/27/2021   Little interest or pleasure in doing things 0   Feeling down, depressed, or hopeless 0   Trouble falling or staying asleep, or sleeping too much -   Feeling tired or having little energy -   Poor appetite or overeating -   Feeling bad about yourself - or that you are a failure or have let yourself or your family down -   Trouble concentrating on things, such as reading the newspaper or watching television -   Moving or speaking so slowly that other people could have noticed. Or the opposite - being so fidgety or restless that you have been moving around a lot more than usual -   Thoughts that you would be better off dead, or of hurting yourself in some way -   PHQ-2 Score 0   PHQ-9 Total Score 0   If you checked off any problems, how difficult have these problems made it for you to do your work, take care of things at home, or get along with other people? -       LABS  No results found for this visit on 06/13/22. Hospital Outpatient Visit on 05/25/2022   Component Date Value Ref Range Status    TSH, 3RD GENERATION 05/25/2022 0.951  0.358 - 3.740 uIU/mL Final       Assessment & Plan      IMPRESSION/PLAN     Diagnosis Orders   1. Acute cystitis without hematuria         Follow up and Dispositions:  No follow-ups on file. Patient is stable on medications and will continue current medications. Refilled the above medications. Will add the following medications: Will change the following medications:  Reviewed medications and side effects in detail. Was given samples of   Will check the above labs. Reviewed most recent labs. Reviewed diet, exercise and weight control. Cardiovascular risks and recommendations reviewed. Patient encouraged to quit smoking. Patient encouraged to take medications as prescribed. Patient encouraged to follow a diabetic/low sodium diet. Use of aspirin to prevent MIs and TIAs discussed. Patient will check blood sugars once daily.     Danish Gasca Latrice Streeter, CARISSAP-C

## 2022-06-28 ENCOUNTER — PATIENT MESSAGE (OUTPATIENT)
Dept: CARE COORDINATION | Age: 77
End: 2022-06-28

## 2022-07-05 ENCOUNTER — CARE COORDINATION (OUTPATIENT)
Dept: CARE COORDINATION | Age: 77
End: 2022-07-05

## 2022-07-08 ENCOUNTER — CLINICAL DOCUMENTATION (OUTPATIENT)
Dept: CASE MANAGEMENT | Age: 77
End: 2022-07-08

## 2022-07-15 ENCOUNTER — CARE COORDINATION (OUTPATIENT)
Dept: CARE COORDINATION | Age: 77
End: 2022-07-15

## 2022-07-18 ENCOUNTER — CARE COORDINATION (OUTPATIENT)
Dept: CARE COORDINATION | Age: 77
End: 2022-07-18

## 2022-07-19 ENCOUNTER — TELEPHONE (OUTPATIENT)
Dept: PULMONOLOGY | Age: 77
End: 2022-07-19

## 2022-07-19 ENCOUNTER — CARE COORDINATION (OUTPATIENT)
Dept: CARE COORDINATION | Facility: CLINIC | Age: 77
End: 2022-07-19

## 2022-07-26 VITALS — SYSTOLIC BLOOD PRESSURE: 112 MMHG | DIASTOLIC BLOOD PRESSURE: 65 MMHG

## 2022-07-28 ENCOUNTER — PREP FOR PROCEDURE (OUTPATIENT)
Dept: ONCOLOGY | Age: 77
End: 2022-07-28

## 2022-07-28 ENCOUNTER — CARE COORDINATION (OUTPATIENT)
Dept: CARE COORDINATION | Age: 77
End: 2022-07-28

## 2022-08-01 ENCOUNTER — CARE COORDINATION (OUTPATIENT)
Dept: CARE COORDINATION | Age: 77
End: 2022-08-01

## 2022-08-01 DIAGNOSIS — B34.9 ACUTE VIRAL SYNDROME: Primary | ICD-10-CM

## 2022-08-01 PROBLEM — J06.9 ACUTE URI: Status: ACTIVE | Noted: 2022-08-01

## 2022-08-01 RX ORDER — PSEUDOEPHEDRINE HYDROCHLORIDE 30 MG/1
30 TABLET ORAL EVERY 6 HOURS PRN
Qty: 20 TABLET | Refills: 1 | Status: CANCELLED | OUTPATIENT
Start: 2022-08-01 | End: 2023-08-01

## 2022-08-02 RX ORDER — ACETAMINOPHEN 325 MG/1
325 TABLET ORAL EVERY 4 HOURS PRN
Qty: 120 TABLET | Refills: 0 | OUTPATIENT
Start: 2022-08-02

## 2022-08-08 ENCOUNTER — TELEPHONE (OUTPATIENT)
Dept: SURGERY | Age: 77
End: 2022-08-08

## 2022-08-09 ENCOUNTER — HOSPITAL ENCOUNTER (OUTPATIENT)
Dept: LAB | Age: 77
Discharge: HOME | End: 2022-08-12

## 2022-08-09 DIAGNOSIS — B34.9 ACUTE VIRAL SYNDROME: Primary | ICD-10-CM

## 2022-08-09 DIAGNOSIS — B34.9 ACUTE VIRAL SYNDROME: ICD-10-CM

## 2022-08-09 ASSESSMENT — ENCOUNTER SYMPTOMS: DYSPNEA ASSOCIATED WITH: MINIMAL EXERTION

## 2022-08-10 ENCOUNTER — CARE COORDINATION (OUTPATIENT)
Dept: CARE COORDINATION | Age: 77
End: 2022-08-10

## 2022-08-11 ENCOUNTER — CARE COORDINATION (OUTPATIENT)
Dept: CARE COORDINATION | Age: 77
End: 2022-08-11

## 2022-08-11 ENCOUNTER — TELEMEDICINE (OUTPATIENT)
Dept: PULMONOLOGY | Age: 77
End: 2022-08-11

## 2022-08-11 ENCOUNTER — SURGICAL CONSULT (OUTPATIENT)
Dept: PULMONOLOGY | Age: 77
End: 2022-08-11

## 2022-08-11 DIAGNOSIS — J43.2 CENTRILOBULAR EMPHYSEMA (HCC): ICD-10-CM

## 2022-08-11 DIAGNOSIS — J44.9 CHRONIC OBSTRUCTIVE PULMONARY DISEASE, UNSPECIFIED COPD TYPE (HCC): Primary | ICD-10-CM

## 2022-08-11 DIAGNOSIS — J45.41 MODERATE PERSISTENT ASTHMA WITH ACUTE EXACERBATION: ICD-10-CM

## 2022-08-11 DIAGNOSIS — J96.01 ACUTE HYPOXEMIC RESPIRATORY FAILURE (HCC): Primary | ICD-10-CM

## 2022-08-11 DIAGNOSIS — J06.9 ACUTE URI: Primary | ICD-10-CM

## 2022-08-11 LAB
FENO: 9 PPB
O2 AMOUNT: NORMAL
SPO2: 97 %

## 2022-08-11 ASSESSMENT — PULMONARY FUNCTION TESTS: FENO: 9

## 2022-08-12 NOTE — PROGRESS NOTES
[address] Nick Ormond. [address], [address] [facility]  [phone]    Patient Name:  Adult Gia      YOB: 1945  Office Visit 2022    ASSESSMENT AND PLAN:  (Medical Decision Making)    Diagnoses and all orders for this visit:  Centrilobular emphysema ([address]  -     AMB POC PULMONARY STRESS TESTING      Luis Antonio James MD    Clinical time for encounter was   minutes. _________________________________________________________________________    HISTORY OF PRESENT ILLNESS:    Ms. Radha Davey in our clinic today who presents with a No chief complaint on file. Kurt Cardenas REVIEW OF SYSTEMS:  10 point review of systems is negative except as reported in HPI. PHYSICAL EXAM: There is no height or weight on file to calculate BMI. There were no vitals filed for this visit. PERTINENT FINDINGS:      DIAGNOSTIC TESTS:                                                                                    LABS:   Lab Results   Component Value Date/Time    WBC 6.3 2022 03:20 PM    HGB 13.5 2022 03:20 PM    HCT 42.6 2022 03:20 PM     2022 03:20 PM     Imaging:  CXR: No results found for this or any previous visit from the past 3650 days. CT Chest: No results found for this or any previous visit from the past 3650 days. Nuclear Medicine: No results found for this or any previous visit from the past 3650 days. PFTs:   Office Spirometry Results Latest Ref Rng & Units 2022   FEF 25-75%-PRE L/sec - - -   FVC L 2.96 3.01 4,561   FEV1 L 2.00 120 213   FEV1 %PRED-PRE % 90 75 2,123   FVC %PRED-PRE % 90 2.27 212   FEV1/FVC % 78 2.27 2,121   FEV1/FVC PRED % - - -   DLCO %PRED % 90 - -   TLC %PRED % 90 - -     No results found for this or any previous visit.    Results for orders placed or performed in visit on 22   NITRIC OXIDE  GAS DETERMINATION   Result Value Ref Range    FeNO 9 ppb    No results found for this or any amLODIPine (NORVASC) 5 MG tablet 1 tablet, Oral    Cholecalciferol (VITAMIN D) 2000 units CAPS capsule Oral    furosemide (LASIX) 10 MG/ML solution Oral, DAILY    lisinopril (PRINIVIL;ZESTRIL) 2.5 mg, Oral, DAILY    lisinopril (PRINIVIL;ZESTRIL) 5 mg, Oral, DAILY    lisinopril (PRINIVIL;ZESTRIL) 2.5 mg, Oral, DAILY    lisinopril-hydrochlorothiazide (ZESTORETIC) 20-12.5 MG per tablet 1 tablet, Oral    LOW-DOSE ASPIRIN PO Oral    medical marijuana Oral, PRN    meloxicam (MOBIC) 15 mg, Oral, DAILY    metFORMIN (GLUCOPHAGE) 500 mg, Oral, 2 TIMES DAILY WITH MEALS    Omeprazole Magnesium (PRILOSEC) 5 mg, Oral    pseudoephedrine (DECONGESTANT) 30 mg, Oral, EVERY 6 HOURS PRN    sertraline (ZOLOFT) 25 MG tablet 1 tablet, Oral

## 2022-08-15 ENCOUNTER — CARE COORDINATION (OUTPATIENT)
Dept: CARE COORDINATION | Age: 77
End: 2022-08-15

## 2022-08-15 DIAGNOSIS — J44.9 CHRONIC OBSTRUCTIVE PULMONARY DISEASE, UNSPECIFIED COPD TYPE (HCC): Primary | ICD-10-CM

## 2022-08-15 DIAGNOSIS — J43.2 CENTRILOBULAR EMPHYSEMA (HCC): ICD-10-CM

## 2022-08-15 DIAGNOSIS — O10.919 CHRONIC HYPERTENSION AFFECTING PREGNANCY: Primary | ICD-10-CM

## 2022-08-15 DIAGNOSIS — J96.01 ACUTE HYPOXEMIC RESPIRATORY FAILURE (HCC): ICD-10-CM

## 2022-08-15 DIAGNOSIS — J44.9 CHRONIC OBSTRUCTIVE PULMONARY DISEASE, UNSPECIFIED COPD TYPE (HCC): ICD-10-CM

## 2022-08-15 RX ORDER — AMOXICILLIN AND CLAVULANATE POTASSIUM 875; 125 MG/1; MG/1
1 TABLET, FILM COATED ORAL 2 TIMES DAILY
Qty: 14 TABLET | Refills: 0 | Status: SHIPPED | OUTPATIENT
Start: 2022-08-15 | End: 2022-10-26 | Stop reason: ALTCHOICE

## 2022-08-15 RX ORDER — ALBUTEROL SULFATE 90 UG/1
2 AEROSOL, METERED RESPIRATORY (INHALATION) EVERY 6 HOURS PRN
Qty: 1 EACH | Refills: 11 | Status: SHIPPED | OUTPATIENT
Start: 2022-08-15 | End: 2022-08-16 | Stop reason: SDUPTHER

## 2022-08-15 RX ORDER — FLUTICASONE PROPIONATE AND SALMETEROL 250; 50 UG/1; UG/1
1 POWDER RESPIRATORY (INHALATION) EVERY 12 HOURS
Qty: 1 EACH | Refills: 11 | Status: SHIPPED | OUTPATIENT
Start: 2022-08-15

## 2022-08-16 DIAGNOSIS — J44.9 CHRONIC OBSTRUCTIVE PULMONARY DISEASE, UNSPECIFIED COPD TYPE (HCC): Primary | ICD-10-CM

## 2022-08-16 RX ORDER — FUROSEMIDE 20 MG/1
20 TABLET ORAL DAILY
Qty: 60 TABLET | Refills: 3 | Status: CANCELLED | OUTPATIENT
Start: 2022-08-16

## 2022-08-18 DIAGNOSIS — J96.01 ACUTE HYPOXEMIC RESPIRATORY FAILURE (HCC): Primary | ICD-10-CM

## 2022-08-18 DIAGNOSIS — J44.9 CHRONIC OBSTRUCTIVE PULMONARY DISEASE, UNSPECIFIED COPD TYPE (HCC): Primary | ICD-10-CM

## 2022-08-19 DIAGNOSIS — J44.9 CHRONIC OBSTRUCTIVE PULMONARY DISEASE, UNSPECIFIED COPD TYPE (HCC): Primary | ICD-10-CM

## 2022-08-22 DIAGNOSIS — J44.9 CHRONIC OBSTRUCTIVE PULMONARY DISEASE, UNSPECIFIED COPD TYPE (HCC): Primary | ICD-10-CM

## 2022-08-22 NOTE — CARE COORDINATION
Remote Patient Monitoring Welcome Note      Date/Time:  2022 3:06 PM     Verified patients name and  as identifiers. Completed and confirmed the following:     Emergency Contact: Pascual Villanueva    [] Patient received all RPM equipment (tablet, scale, blood pressure device and cuff, and pulse oximeter)  Cuff Size: Small  []  Regular   []  Large  []   Weight Scale: Regular   []  Bariatric  []               [] Instructed patient keep box for use when returning equipment                                                          [] Reviewed Patient Welcome Letter with patient                         [] Reviewed expectations for patient and care team  [] Reviewed RPM consent form         [] Instructed patient to keep scale on flat surface                                                         [] Instructed patient to keep tablet plugged in at all times                         [] Instructed how to contact IT support (number listed on welcome letter)  [] Provided Remote Patient Monitoring care  information                 All questions answered at this time.

## 2022-08-23 DIAGNOSIS — J44.9 CHRONIC OBSTRUCTIVE PULMONARY DISEASE, UNSPECIFIED COPD TYPE (HCC): Primary | ICD-10-CM

## 2022-08-23 LAB
EXPIRATORY TIME: NORMAL
FEF 25-75% %PRED-PRE: NORMAL
FEF 25-75% PRED: NORMAL
FEF 25-75%-PRE: NORMAL
FEV1 %PRED-PRE: 1 %
FEV1 PRED: NORMAL
FEV1/FVC %PRED-PRE: NORMAL
FEV1/FVC PRED: NORMAL
FEV1/FVC: 1 %
FEV1: NORMAL
FVC %PRED-PRE: NORMAL
FVC PRED: NORMAL
FVC: NORMAL
PEF %PRED-PRE: NORMAL
PEF PRED: NORMAL
PEF-PRE: NORMAL

## 2022-08-23 ASSESSMENT — SLEEP AND FATIGUE QUESTIONNAIRES
HOW LIKELY ARE YOU TO NOD OFF OR FALL ASLEEP WHILE LYING DOWN TO REST IN THE AFTERNOON WHEN CIRCUMSTANCES PERMIT: 2
HOW LIKELY ARE YOU TO NOD OFF OR FALL ASLEEP WHILE SITTING AND TALKING TO SOMEONE: 2
HOW LIKELY ARE YOU TO NOD OFF OR FALL ASLEEP WHILE WATCHING TV: 2
HOW LIKELY ARE YOU TO NOD OFF OR FALL ASLEEP WHILE SITTING INACTIVE IN A PUBLIC PLACE: 2
HOW LIKELY ARE YOU TO NOD OFF OR FALL ASLEEP WHEN YOU ARE A PASSENGER IN A CAR FOR AN HOUR WITHOUT A BREAK: 2
HOW LIKELY ARE YOU TO NOD OFF OR FALL ASLEEP WHILE SITTING AND READING: 2
ESS TOTAL SCORE: 16
NECK CIRCUMFERENCE (INCHES): 15
HOW LIKELY ARE YOU TO NOD OFF OR FALL ASLEEP WHILE SITTING QUIETLY AFTER LUNCH WITHOUT ALCOHOL: 2
HOW LIKELY ARE YOU TO NOD OFF OR FALL ASLEEP IN A CAR, WHILE STOPPED FOR A FEW MINUTES IN TRAFFIC: 2

## 2022-08-23 ASSESSMENT — PULMONARY FUNCTION TESTS
FEV1/FVC: 1
FEV1_PERCENT_PREDICTED_PRE: 1

## 2022-08-24 DIAGNOSIS — J44.9 CHRONIC OBSTRUCTIVE PULMONARY DISEASE, UNSPECIFIED COPD TYPE (HCC): Primary | ICD-10-CM

## 2022-08-26 DIAGNOSIS — J44.9 CHRONIC OBSTRUCTIVE PULMONARY DISEASE, UNSPECIFIED COPD TYPE (HCC): Primary | ICD-10-CM

## 2022-08-26 NOTE — CARE COORDINATION
Remote Patient Kit Ordering Note      Date/Time:  8/26/2022 9:00 AM    CCSS notified patient of RPM equipment order. [x] Patient will receive package over the next 2-4 business days. Someone 21 years or older must be present to sign for UPS delivery. [x] Patient to contact virtual installation-specific phone number listed in the patient instructions. [x] If the patient does not contact HRS within 24 hours, an [employer] Ambassador [facility] will call the patient directly: If the patient does not answer, HRS will follow up with the clinical team notifying them about the unsuccessful attempt to contact the patient. HRS will make three call attempts to the patient. [x] ACM will contact patient once equipment is active to welcome them to the program.                                                         [x] Hours of ACM monitoring - Monday-Friday [phone]                     All questions answered at this time. ACM/CTN  made aware the Remote Patient Monitoring set up has been completed.

## 2022-08-29 ENCOUNTER — TELEMEDICINE (OUTPATIENT)
Dept: FAMILY MEDICINE CLINIC | Age: 77
End: 2022-08-29

## 2022-08-29 DIAGNOSIS — Z00.00 INITIAL MEDICARE ANNUAL WELLNESS VISIT: Primary | ICD-10-CM

## 2022-08-31 ASSESSMENT — PULMONARY FUNCTION TESTS
FEV1_PERCENT_PREDICTED_PRE: 1
FEV1/FVC: 1

## 2022-09-01 DIAGNOSIS — E11.9 DIABETES MELLITUS WITHOUT COMPLICATION (HCC): ICD-10-CM

## 2022-09-01 DIAGNOSIS — L98.9 SKIN LESION: Primary | ICD-10-CM

## 2022-09-01 DIAGNOSIS — R05.9 COUGH: ICD-10-CM

## 2022-09-01 RX ORDER — ACETAMINOPHEN 325 MG/1
325 TABLET ORAL EVERY 4 HOURS PRN
Qty: 120 TABLET | Status: CANCELLED | OUTPATIENT
Start: 2022-09-01

## 2022-09-01 RX ORDER — LISINOPRIL 5 MG/1
2.5 TABLET ORAL DAILY
Qty: 45 TABLET | Refills: 1 | Status: CANCELLED | OUTPATIENT
Start: 2022-09-01

## 2022-09-01 RX ORDER — ALBUTEROL SULFATE 90 UG/1
2 AEROSOL, METERED RESPIRATORY (INHALATION) EVERY 6 HOURS PRN
Qty: 1 EACH | Refills: 11 | Status: CANCELLED | OUTPATIENT
Start: 2022-09-01

## 2022-09-06 LAB
EXPIRATORY TIME: ABNORMAL
FEF 25-75% %PRED-PRE: ABNORMAL
FEF 25-75% PRED: ABNORMAL
FEF 25-75%-PRE: ABNORMAL
FEV1 %PRED-PRE: 1 %
FEV1 PRED: ABNORMAL
FEV1/FVC %PRED-PRE: ABNORMAL
FEV1/FVC PRED: ABNORMAL
FEV1/FVC: 2 %
FEV1: ABNORMAL
FVC %PRED-PRE: ABNORMAL
FVC PRED: ABNORMAL
FVC: ABNORMAL
PEF %PRED-PRE: ABNORMAL
PEF PRED: ABNORMAL
PEF-PRE: ABNORMAL

## 2022-09-06 ASSESSMENT — PULMONARY FUNCTION TESTS
FEV1/FVC: 2
FEV1_PERCENT_PREDICTED_PRE: 1

## 2022-09-07 LAB
EXPIRATORY TIME: NORMAL
FEF 25-75% %PRED-PRE: NORMAL
FEF 25-75% PRED: NORMAL
FEF 25-75%-PRE: NORMAL
FEV1 %PRED-PRE: 1 %
FEV1 PRED: NORMAL
FEV1/FVC %PRED-PRE: NORMAL
FEV1/FVC PRED: NORMAL
FEV1/FVC: 2 %
FEV1: NORMAL
FVC %PRED-PRE: NORMAL
FVC PRED: NORMAL
FVC: NORMAL
PEF %PRED-PRE: NORMAL
PEF PRED: NORMAL
PEF-PRE: NORMAL

## 2022-09-07 ASSESSMENT — PULMONARY FUNCTION TESTS
FEV1/FVC: 2
FEV1_PERCENT_PREDICTED_PRE: 1

## 2022-09-08 LAB
EXPIRATORY TIME: NORMAL
FEF 25-75% %PRED-PRE: NORMAL
FEF 25-75% PRED: NORMAL
FEF 25-75%-PRE: NORMAL
FEV1 %PRED-PRE: NORMAL %
FEV1 PRED: NORMAL
FEV1/FVC %PRED-PRE: NORMAL
FEV1/FVC PRED: NORMAL
FEV1/FVC: NORMAL %
FEV1: NORMAL
FVC %PRED-PRE: NORMAL
FVC PRED: NORMAL
FVC: NORMAL
PEF %PRED-PRE: NORMAL
PEF PRED: NORMAL
PEF-PRE: NORMAL

## 2022-09-09 ENCOUNTER — CARE COORDINATION (OUTPATIENT)
Dept: CARE COORDINATION | Age: 77
End: 2022-09-09

## 2022-09-12 ENCOUNTER — PREP FOR PROCEDURE (OUTPATIENT)
Dept: ORTHOPEDIC SURGERY | Age: 77
End: 2022-09-12

## 2022-09-12 ENCOUNTER — CARE COORDINATION (OUTPATIENT)
Dept: CARE COORDINATION | Age: 77
End: 2022-09-12

## 2022-09-12 ENCOUNTER — CARE COORDINATION (OUTPATIENT)
Dept: CARE COORDINATION | Facility: CLINIC | Age: 77
End: 2022-09-12

## 2022-09-14 ENCOUNTER — PREP FOR PROCEDURE (OUTPATIENT)
Dept: ORTHOPEDIC SURGERY | Age: 77
End: 2022-09-14

## 2022-09-14 ENCOUNTER — CARE COORDINATION (OUTPATIENT)
Dept: CARE COORDINATION | Facility: CLINIC | Age: 77
End: 2022-09-14

## 2022-09-14 RX ORDER — SODIUM CHLORIDE 0.9 % (FLUSH) 0.9 %
5-40 SYRINGE (ML) INJECTION EVERY 12 HOURS SCHEDULED
OUTPATIENT
Start: 2022-09-14

## 2022-09-14 RX ORDER — SODIUM CHLORIDE 0.9 % (FLUSH) 0.9 %
5-40 SYRINGE (ML) INJECTION PRN
OUTPATIENT
Start: 2022-09-14

## 2022-09-14 RX ORDER — SODIUM CHLORIDE 9 MG/ML
INJECTION, SOLUTION INTRAVENOUS PRN
OUTPATIENT
Start: 2022-09-14

## 2022-09-16 DIAGNOSIS — J44.9 CHRONIC OBSTRUCTIVE PULMONARY DISEASE, UNSPECIFIED COPD TYPE (HCC): Primary | ICD-10-CM

## 2022-09-16 LAB
EXPIRATORY TIME: NORMAL
FEF 25-75% %PRED-PRE: NORMAL
FEF 25-75% PRED: NORMAL
FEF 25-75%-PRE: NORMAL
FEV1 %PRED-PRE: 96 %
FEV1 PRED: NORMAL
FEV1/FVC %PRED-PRE: NORMAL
FEV1/FVC PRED: NORMAL
FEV1/FVC: 69 %
FEV1: 1.89 L
FVC %PRED-PRE: 104 %
FVC PRED: NORMAL
FVC: 2.74 L
PEF %PRED-PRE: NORMAL
PEF PRED: NORMAL
PEF-PRE: NORMAL

## 2022-09-16 ASSESSMENT — PULMONARY FUNCTION TESTS
FVC: 2.74
FEV1_PERCENT_PREDICTED_PRE: 96
FEV1: 1.89
FVC_PERCENT_PREDICTED_PRE: 104
FEV1/FVC: 69

## 2022-09-19 NOTE — PATIENT INSTRUCTIONS
this instruction, always ask your healthcare professional. [place] [phone] any warranty or liability for your use of this information.

## 2022-09-21 ENCOUNTER — CARE COORDINATION (OUTPATIENT)
Dept: CARE COORDINATION | Age: 77
End: 2022-09-21

## 2022-09-21 DIAGNOSIS — H61.22 IMPACTED CERUMEN OF LEFT EAR: Primary | ICD-10-CM

## 2022-09-21 NOTE — CARE COORDINATION
Remote Patient Monitoring Note      Date/Time:  9/21/2022 9:32 AM    CCSS reviewed patients reported daily Remote Patient Monitoring metrics. All reported metrics are within normal limits. Plan/Follow Up:  Will continue to review, monitor and address alerts with follow up based on severity of symptoms and risk factors ---- Current Patient Metrics ---- Blood Pressure: -/-, -bpm Glucose: -mg/dl Pulseox: -%, -bpm Survey: - Note Created at: 09/21/2022 09:33 AM ET ---- Time-Spent: 2 minutes 0 seconds

## 2022-09-21 NOTE — CARE COORDINATION
Remote Patient Kit Ordering Note      Date/Time:  9/21/2022 9:10 AM      [x] CCSS confirmed patient shipping address  [x] Patient will receive package over the next 2-4 business days. Someone 21 years or older must be present to sign for UPS delivery. [x] Patient to contact virtual installation-specific phone number listed in the patient instructions. [x] If the patient does not contact HRS within 24 hours, an [employer] Ambassador [facility] will call the patient directly: If the patient does not answer, HRS will follow up with the clinical team notifying them about the unsuccessful attempt to contact the patient. HRS will make three call attempts to the patient. [] ACM will contact patient once equipment is active to welcome them to the program.                                                         [x] Hours of RPM monitoring - Monday-Friday [phone]                     All questions answered at this time. ACM made aware the Remote Patient Monitoring set up has been completed. CCSS notified patient of RPM equipment order.

## 2022-09-27 NOTE — CARE COORDINATION
Ambulatory Care Coordination Note  9/29/2022    ACC: Spike Gibbs RN    Patient test    Offered patient enrollment in the Remote Patient Monitoring (RPM) program for in-home monitoring: NA. Ambulatory Care Coordination Assessment    Care Coordination Protocol  Week 1 - Initial Assessment     Do you have all of your prescriptions and are they filled?: Yes (Comment: 4/7/22 reviewed and updated ALL. )  Barriers to medication adherence: Forgets to take, Other, Complexity of regimen, Does not feel there is a need/No perceived effect  Other barriers to medication adherence: pt unable to name all medications  Are you able to afford your medications?: With Assistance  How often do you have trouble taking your medications the way you have been told to take them?: Sometimes I take them as prescribed. Do you have Home O2 Therapy?: Yes   Oxygen Regimen: PRN, Continuous Flow - Enter rate/FIO2: 4   Method of Delivery: Nasal Cannula, Portable conserving device, Concentrater   CPAP Use: None, CPAP      Ability to seek help/take action for Emergent Urgent situations i.e. fire, crime, inclement weather or health crisis. : Dependent  Ability to ambulate to restroom: Dependent  Ability handle personal hygeine needs (bathing/dressing/grooming): Dependent  Ability to manage Medications: Dependent  Ability to prepare Food Preparation: Dependent  Ability to maintain home (clean home, laundry): Dependent  Ability to drive and/or has transportation: Dependent  Ability to do shopping: Dependent  Ability to manage finances: Dependent  Is patient able to live independently?: Yes     Current Housing: Apartment  For whom are you the caregiver?: yes, to children and wife  Does the person that you care for see a [facility] PCP?: Yes           Frequent urination at night?: No  Do you use rails/bars?: Yes  Do you have a non-slip tub mat?: Yes     Are you experiencing loss of meaning?: Yes  Are you experiencing loss of hope and peace?: Yes Suggested Interventions and Community Resources                  Prior to Admission medications    Medication Sig Start Date End Date Taking? Authorizing Provider   Melatonin 3 MG CAPS takes 1 tablet nightly as needed    Historical Provider, MD   lisinopril (PRINIVIL;ZESTRIL) 5 MG tablet Take 1 tablet by mouth in the morning. 8/16/22   RAMEZ Jeong   albuterol sulfate HFA (PROVENTIL;VENTOLIN;PROAIR) 108 (90 Base) MCG/ACT inhaler Inhale 2 puffs into the lungs every 6 hours as needed for Wheezing 8/16/22   RAMEZ Jeong   fluticasone-salmeterol (WIXELA INHUB) 250-50 MCG/ACT AEPB diskus inhaler Inhale 1 puff into the lungs in the morning and 1 puff in the evening. 8/15/22   Isac Salguero MD   amoxicillin-clavulanate (AUGMENTIN) 875-125 MG per tablet Take 1 tablet by mouth in the morning and 1 tablet before bedtime. Patient not taking: Reported on 9/14/2022 8/15/22   Isac Salguero MD   lisinopril (PRINIVIL;ZESTRIL) 5 MG tablet Take 0.5 tablets by mouth in the morning. 8/10/22   Historical Provider, MD   metFORMIN (GLUCOPHAGE) 500 MG tablet Take 1 tablet by mouth in the morning and 1 tablet in the evening. Take with meals.  8/4/22   Aliza Jordan MD   pseudoephedrine (DECONGESTANT) 30 MG tablet Take 1 tablet by mouth every 6 hours as needed for Congestion 7/31/22 7/31/23  DARBY Humphrey - NP   albuterol sulfate HFA (PROVENTIL;VENTOLIN;PROAIR) 108 (90 Base) MCG/ACT inhaler Inhale 2 puffs into the lungs every 6 hours as needed    Historical Provider, MD   meloxicam (MOBIC) 15 MG tablet Take 1 tablet by mouth daily 7/13/22 8/12/22  Eric Wood MD   LOW-DOSE ASPIRIN PO Take by mouth    Historical Provider, MD   Cholecalciferol (VITAMIN D) 2000 units CAPS capsule Take by mouth    Historical Provider, MD   amLODIPine (NORVASC) 5 MG tablet Take 1 tablet by mouth  9/20/18   Historical Provider, MD   lisinopril-hydroCHLOROthiazide (PRINZIDE;ZESTORETIC) 20-12.5 MG per tablet Take 1 tablet by mouth 4/1/18   Historical Provider, MD   sertraline (ZOLOFT) 25 MG tablet Take 1 tablet by mouth    Historical Provider, MD   Omeprazole Magnesium 2.5 MG PACK Take 5 mg by mouth 6/19/18   Historical Provider, MD   medical marijuana Take by mouth as needed.     Historical Provider, MD   furosemide (LASIX) 10 MG/ML solution Take by mouth daily     Historical Provider, MD       Future Appointments   Date Time Provider Zuri Pearson   10/26/2022  1:30 PM Jefferson Long MD NPSSN [facility] AMB   11/23/2022 10:30 AM [address] [place] [address]

## 2022-09-28 ENCOUNTER — TRANSCRIBE ORDERS (OUTPATIENT)
Dept: SCHEDULING | Age: 77
End: 2022-09-28

## 2022-09-28 ENCOUNTER — CLINICAL DOCUMENTATION (OUTPATIENT)
Dept: SPIRITUAL SERVICES | Age: 77
End: 2022-09-28

## 2022-09-28 DIAGNOSIS — Z12.31 SCREENING MAMMOGRAM FOR HIGH-RISK PATIENT: Primary | ICD-10-CM

## 2022-09-28 ASSESSMENT — PATIENT HEALTH QUESTIONNAIRE - PHQ9
SUM OF ALL RESPONSES TO PHQ QUESTIONS 1-9: 10
SUM OF ALL RESPONSES TO PHQ QUESTIONS 1-9: 10
SUM OF ALL RESPONSES TO PHQ QUESTIONS 1-9: 9

## 2022-09-28 NOTE — CARE COORDINATION
[facility] Care Transitions Initial Follow Up Call    Call within 2 business days of discharge: Yes    Care Transition Nurse contacted the patient by telephone to perform post hospital discharge assessment. Verified name and  with patient as identifiers. Provided introduction to self, and explanation of the Care Transition Nurse role. Patient: Adult Gia Patient : 1945   MRN: [id number]  Reason for Admission: CHF  Discharge Date: 20 RARS: Readmission Risk Score: 6.9 ( )      Last Discharge  Street       Date Complaint Diagnosis Description Type Department Provider    20   Admission (Discharged) [facility] Ec/Ip Surgery Physician, MD            Was this an external facility discharge? No Discharge Facility: [facility]    Challenges to be reviewed by the provider   Additional needs identified to be addressed with provider: No  none               Method of communication with provider: none. Spoke with patient about daily weights. Care Transition Nurse reviewed discharge instructions, medical action plan, and red flags with patient who verbalized understanding. The patient was given an opportunity to ask questions and does not have any further questions or concerns at this time. Were discharge instructions available to patient? Yes. Reviewed appropriate site of care based on symptoms and resources available to patient including: PCP  Urgent care clinics. The patient agrees to contact the PCP office for questions related to their healthcare. Advance Care Planning:   Does patient have an Advance Directive: reviewed and current. Medication reconciliation was performed with patient, who verbalizes understanding of administration of home medications.  Medications reviewed, 1111F entered: N/A    Was patient discharged with a pulse oximeter? no    Non-face-to-face services provided:  Obtained and reviewed discharge summary and/or continuity of care documents    Offered patient enrollment in the

## 2022-09-29 ENCOUNTER — CARE COORDINATION (OUTPATIENT)
Dept: CARE COORDINATION | Age: 77
End: 2022-09-29

## 2022-09-29 ASSESSMENT — PULMONARY FUNCTION TESTS
FEV1_PERCENT_PREDICTED_PRE: 1
FEV1/FVC: 1

## 2022-10-04 ENCOUNTER — CARE COORDINATION (OUTPATIENT)
Dept: CARE COORDINATION | Age: 77
End: 2022-10-04

## 2022-10-05 ENCOUNTER — TRANSCRIBE ORDERS (OUTPATIENT)
Dept: SCHEDULING | Age: 77
End: 2022-10-05

## 2022-10-05 DIAGNOSIS — Z12.31 SCREENING MAMMOGRAM FOR HIGH-RISK PATIENT: Primary | ICD-10-CM

## 2022-10-06 ENCOUNTER — TELEPHONE (OUTPATIENT)
Dept: PULMONOLOGY | Age: 77
End: 2022-10-06

## 2022-10-06 NOTE — TELEPHONE ENCOUNTER
Diagnostic Review:    6MWT (DATEu6)   distance    Start;end O2 sat    Max AUDRA dyspnea    Change of 30m? Right Heart Cath (DATEty)   RA    RV    PA    PCWP    CO    PVR    VASOREACTIVE? TTE (DATEtu)   LV EF  Diastolic Function   RV    Peak TRV    RVSP    COMMENTS    No results found for this or any previous visit. Oximetry and/or sleep study result:    Chest CT:  CT WITHOUT CONTRAST: No results found for this or any previous visit from the past 365 days. CT WITH CONTRAST: No results found for this or any previous visit from the past 365 days. CT HIGH RES: No results found for this or any previous visit from the past 365 days. CT PE PROTOCOL: No results found for this or any previous visit from the past 365 days. LDCT SCREENING: No results found for this or any previous visit from the past 365 days. V/Q Scan:  No valid procedures specified.     Lab Diagnostics:  HIV  No results found for: [id number]  HCV No results found for: HEPCAB  JOVON No results found for: JOVON  BNP @No results found for: BNP  No results found for: DDIMER       yrhu (DATE)   FVC    FEV1    FEV1/FVC    TLC    FRC    RV    DLCO      Office Spirometry Results Latest Ref Rng & Units 9/29/2022 9/16/2022 9/8/2022   FEF 25-75%-PRE L/sec - - -   FVC L - 2.74 -   FEV1 L - 1.89 -   FEV1 %PRED-PRE % 1 96 test   FVC %PRED-PRE % - 104 -   FEV1/FVC % 1 69 test   FEV1/FVC PRED % - - -   DLCO %PRED % - - -   TLC %PRED % - - -

## 2022-10-06 NOTE — TELEPHONE ENCOUNTER
[facility] Pulmonary Telephone Triage    10/6/2022    Main Concern:  fgraertg  Weight today:  Dry weight:    ROS:      Medications for Pulmonary Hypertension were reconciled and the patient is currently taking:    Drug   dose Normal dose range Started recently? AdCirca (tadalafil)  20mg or 40mg po daily    Revatio (sildenafil)  10-30mg po tid    Letairis (ambrisentan)  5mg -10mg po daily    Opsumit (macitentan)  10mg po daily    Adempas (riociguat)  1mg-3mg po tid    Tyvaso (inhaled treprostinil)  18mg (1 puff)-9 puffs 4x/day    Uptravi (selexipag)  200mcg -1600mcg bid          Lasix (furosemide)      Demadex (torsemide)      Bumex (bumetadine)      Aldactone (spironolactone)  12.5mg-50mg daily    Inspra (eplerenone)  25mg-50mg daily      Lab Results   Component Value Date/Time     05/22/2022 09:25 AM    K 3.8 05/22/2022 09:25 AM     05/22/2022 09:25 AM    CO2 25 05/22/2022 09:25 AM    BUN 32 05/22/2022 09:25 AM    CREATININE 2.10 05/22/2022 09:25 AM    GLUCOSE 96 05/22/2022 09:25 AM    CALCIUM 10.3 05/22/2022 09:25 AM        I have contacted Dr. Mally Loyd or RAMEZ Felipe directly by phone to discuss this concern.   Our plan is:    abran Vargas RN

## 2022-10-06 NOTE — CARE COORDINATION
Remote Patient Monitoring Enrollment Note      Date/Time:  10/6/2022 11:05 AM    Offered patient enrollment in the [facility] Remote Patient Monitoring (RPM) program for in home monitoring for CHF. Patient accepted RPM services. Patient will be monitoring the following daily:  pulse ox heart rate  survey response  weight    ACM reviewed the information below with patient:    Emergency Contact: Mrs.    [x] A member from the care coordination team will reach out to notify the patient once the RPM kit is ordered. [x] Once the kit is delivered, the [facility] team will contact the patient after UPS deliver to assist with set up. [x] Determined BP cuff size: regular (9.05\"-15.74\")      [x] Determined weight scale: regular (<330lbs)                                                 [x] Hours of ACM monitoring - Monday-Friday [phone]                         All questions about RPM program answered at this time.

## 2022-10-10 ENCOUNTER — CARE COORDINATION (OUTPATIENT)
Dept: CARE COORDINATION | Age: 77
End: 2022-10-10

## 2022-10-10 NOTE — CARE COORDINATION
Remote Patient Kit Ordering Note      Date/Time:  10/10/2022 12:41 PM      [x] CCSS confirmed patient shipping address  [x] Patient will receive package over the next 2-4 business days. Someone 21 years or older must be present to sign for UPS delivery. [x] Patient to contact virtual installation-specific phone number listed in the patient instructions. [x] If the patient does not contact HRS within 24 hours, an [employer] Ambassador [facility] [employer] will call the patient directly: If the patient does not answer, HRS will follow up with the clinical team notifying them about the unsuccessful attempt to contact the patient. HRS will make three call attempts to the patient. [] LPN will contact patient once equipment is active to welcome them to the program.                                                         [] Hours of RPM monitoring - Monday-Friday [phone]                     All questions answered at this time. ACM made aware the Remote Patient Monitoring set up has been completed. CCSS notified patient of RPM equipment order.

## 2022-10-17 DIAGNOSIS — J44.9 CHRONIC OBSTRUCTIVE PULMONARY DISEASE, UNSPECIFIED COPD TYPE (HCC): Primary | ICD-10-CM

## 2022-10-17 LAB
EXPIRATORY TIME: NORMAL
FEF 25-75% %PRED-PRE: NORMAL
FEF 25-75% PRED: NORMAL
FEF 25-75%-PRE: NORMAL
FEV1 %PRED-PRE: 173 %
FEV1 PRED: NORMAL
FEV1/FVC %PRED-PRE: NORMAL
FEV1/FVC PRED: NORMAL
FEV1/FVC: 85 %
FEV1: 4.15 L
FVC %PRED-PRE: 154 %
FVC PRED: NORMAL
FVC: 4.9 L
PEF %PRED-PRE: NORMAL
PEF PRED: NORMAL
PEF-PRE: NORMAL

## 2022-10-17 ASSESSMENT — PULMONARY FUNCTION TESTS
FVC: 4.90
FVC_PERCENT_PREDICTED_PRE: 154
FEV1/FVC: 85
FEV1_PERCENT_PREDICTED_PRE: 173
FEV1: 4.15

## 2022-10-19 ENCOUNTER — CARE COORDINATION (OUTPATIENT)
Dept: CARE COORDINATION | Facility: CLINIC | Age: 77
End: 2022-10-19

## 2022-10-20 NOTE — CARE COORDINATION
Remote Patient Monitoring Note      Date/Time:  10/20/2022 10:10 AM    CCSS reviewed patients reported daily Remote Patient Monitoring metrics. All reported metrics are within normal limits. Plan/Follow Up:  Will continue to review, monitor and address alerts with follow up based on severity of symptoms and risk factors ---- Current Patient Metrics ---- Activity: - mins Blood Pressure: -/-, -bpm Survey: - Weight: -lbs Note Created at: 10/20/2022 10:13 AM ET ---- Time-Spent: 2 minutes 0 seconds

## 2022-10-21 ENCOUNTER — CARE COORDINATION (OUTPATIENT)
Dept: CARE COORDINATION | Age: 77
End: 2022-10-21

## 2022-10-21 NOTE — CARE COORDINATION
Ambulatory Care Coordination Note  10/21/2022    ACC: Cesar Boateng RN    Rpm enrollmet    Offered patient enrollment in the Remote Patient Monitoring (RPM) program for in-home monitoring: Yes, patient enrolled. Remote Patient Monitoring Enrollment Note      Date/Time:  10/21/2022 8:55 AM    Offered patient enrollment in the [facility] Remote Patient Monitoring (RPM) program for in home monitoring for COPD and HTN. Patient accepted RPM services. Patient will be monitoring the following daily:  activity level  blood pressure reading  pulse ox heart rate  weight    ACM reviewed the information below with patient:    Emergency Contact: mom    [x] A member from the care coordination team will reach out to notify the patient once the RPM kit is ordered. [x] Once the kit is delivered, the [facility] team will contact the patient after UPS deliver to assist with set up. [x] Determined BP cuff size: large (13.8\"-19.68\")      [x] Determined weight scale: bariatric (330-550lbs)                                                 [x] Hours of ACM monitoring - Monday-Friday [id number]                         All questions about RPM program answered at this time. Lab Results       None                 Goals Addressed    None         Prior to Admission medications    Medication Sig Start Date End Date Taking? Authorizing Provider   metFORMIN (GLUCOPHAGE) 500 MG tablet Take 1 tablet by mouth 2 times daily (with meals) 10/7/22   Desi Nugent MD   Melatonin 3 MG CAPS takes 1 tablet nightly as needed  Patient not taking: Reported on 10/17/2022    Historical Provider, MD   lisinopril (PRINIVIL;ZESTRIL) 5 MG tablet Take 1 tablet by mouth in the morning.  8/16/22   RAMEZ Jeong   albuterol sulfate HFA (PROVENTIL;VENTOLIN;PROAIR) 108 (90 Base) MCG/ACT inhaler Inhale 2 puffs into the lungs every 6 hours as needed for Wheezing 8/16/22   RAMEZ Olson   fluticasone-salmeterol (Duwaine Stefano) 250-50 MCG/ACT AEPB diskus inhaler Inhale 1 puff into the lungs in the morning and 1 puff in the evening. 8/15/22   Beverly Braden MD   amoxicillin-clavulanate (AUGMENTIN) 875-125 MG per tablet Take 1 tablet by mouth in the morning and 1 tablet before bedtime. Patient not taking: No sig reported 8/15/22   Beverly Braden MD   lisinopril (PRINIVIL;ZESTRIL) 5 MG tablet Take 0.5 tablets by mouth in the morning. 8/10/22   Historical Provider, MD   pseudoephedrine (DECONGESTANT) 30 MG tablet Take 1 tablet by mouth every 6 hours as needed for Congestion 7/31/22 7/31/23  DARBY Reardon - NP   meloxicam ERIC SANTANA [facility]) 15 MG tablet Take 1 tablet by mouth daily 7/13/22 8/12/22  Nini Castro MD   LOW-DOSE ASPIRIN PO Take by mouth    Historical Provider, MD   Cholecalciferol (VITAMIN D) 2000 units CAPS capsule Take by mouth    Historical Provider, MD   amLODIPine (NORVASC) 5 MG tablet Take 1 tablet by mouth  9/20/18   Historical Provider, MD   lisinopril-hydroCHLOROthiazide (PRINZIDE;ZESTORETIC) 20-12.5 MG per tablet Take 1 tablet by mouth 4/1/18   Historical Provider, MD   sertraline (ZOLOFT) 25 MG tablet Take 1 tablet by mouth    Historical Provider, MD   Omeprazole Magnesium 2.5 MG PACK Take 5 mg by mouth 6/19/18   Historical Provider, MD   medical marijuana Take by mouth as needed.   Patient not taking: Reported on 10/17/2022    Historical Provider, MD   furosemide (LASIX) 10 MG/ML solution Take by mouth daily     Historical Provider, MD       Future Appointments   Date Time Provider Zuri Pearson   10/26/2022  1:30 PM MD JOE SalinasSN [facility]